# Patient Record
Sex: MALE | Race: WHITE | NOT HISPANIC OR LATINO | Employment: FULL TIME | URBAN - METROPOLITAN AREA
[De-identification: names, ages, dates, MRNs, and addresses within clinical notes are randomized per-mention and may not be internally consistent; named-entity substitution may affect disease eponyms.]

---

## 2017-01-26 ENCOUNTER — HOSPITAL ENCOUNTER (OUTPATIENT)
Dept: ORTHOPEDIC SURGERY | Facility: CLINIC | Age: 37
Discharge: HOME | End: 2017-01-26
Attending: PODIATRIST | Admitting: PODIATRIST

## 2017-03-07 ENCOUNTER — HOSPITAL ENCOUNTER (OUTPATIENT)
Dept: PHYSICAL THERAPY | Facility: HOSPITAL | Age: 37
Setting detail: RECURRING SERIES
Discharge: HOME | End: 2017-03-30
Attending: PODIATRIST | Admitting: PODIATRIST

## 2017-05-09 ENCOUNTER — HOSPITAL ENCOUNTER (OUTPATIENT)
Dept: PREADMISSION TESTING | Facility: HOSPITAL | Age: 37
Discharge: HOME | End: 2017-05-09
Attending: PODIATRIST | Admitting: PODIATRIST

## 2017-05-09 LAB
ANION GAP SERPL CALC-SCNC: 13.1 MMOL/L (ref 10–20)
BACTERIA SPEC AEROBE CULT: NORMAL
BASOPHILS # BLD AUTO: 0 10*3/UL (ref 0–0.2)
BASOPHILS NFR BLD AUTO: 1 % (ref 0–2)
BUN SERPL-MCNC: 15 MG/DL (ref 8–20)
BUN/CREAT SERPL: 13.6 (ref 6.2–20.3)
CALCIUM SERPL-MCNC: 9.4 MG/DL (ref 8.9–10.3)
CHLORIDE SERPL-SCNC: 103 MMOL/L (ref 101–111)
CONV CO2: 25 MMOL/L (ref 22–32)
CREAT UR-MCNC: 1.1 MG/DL (ref 0.7–1.2)
DIFFERENTIAL METHOD BLD: (no result)
EOSINOPHIL # BLD AUTO: 0.2 10*3/UL (ref 0–0.3)
EOSINOPHIL # BLD AUTO: 3 % (ref 0–3)
ERYTHROCYTE [DISTWIDTH] IN BLOOD BY AUTOMATED COUNT: 13.5 % (ref 11.5–14.5)
GLUCOSE SERPL-MCNC: 84 MG/DL (ref 65–99)
HCT VFR BLD AUTO: 46.3 % (ref 40–54)
HGB BLD-MCNC: 15.4 G/DL (ref 14–18)
LYMPHOCYTES # BLD AUTO: 1.2 10*3/UL (ref 0.8–4.8)
LYMPHOCYTES NFR BLD AUTO: 17 % (ref 18–42)
Lab: NORMAL
MCH RBC QN AUTO: 31.1 PG (ref 26–32)
MCHC RBC AUTO-ENTMCNC: 33.2 G/DL (ref 32–36)
MCV RBC AUTO: 93.6 FL (ref 80–94)
MICRO REPORT STATUS: NORMAL
MONOCYTES # BLD AUTO: 0.7 10*3/UL (ref 0.1–1.3)
MONOCYTES NFR BLD AUTO: 10 % (ref 2–11)
NEUTROPHILS # BLD AUTO: 4.7 10*3/UL (ref 2.3–8.6)
NEUTROPHILS NFR BLD AUTO: 69 % (ref 50–75)
NRBC BLD AUTO-RTO: 0 /100{WBCS}
NRBC/RBC NFR BLD MANUAL: 0 10*3/UL
PLATELET # BLD AUTO: 183 10*3/UL (ref 150–450)
PMV BLD AUTO: 9.3 FL (ref 7.4–10.4)
POTASSIUM SERPL-SCNC: 4.1 MMOL/L (ref 3.6–5.1)
RBC # BLD AUTO: 4.95 10*6/UL (ref 4.6–6)
SODIUM SERPL-SCNC: 137 MMOL/L (ref 136–144)
SPECIMEN SOURCE: NORMAL
WBC # BLD AUTO: 6.8 10*3/UL (ref 4.5–11.5)

## 2017-05-12 ENCOUNTER — HOSPITAL ENCOUNTER (OUTPATIENT)
Dept: PREOP | Facility: HOSPITAL | Age: 37
Setting detail: HOSPITAL OUTPATIENT SURGERY
Discharge: HOME | End: 2017-05-12
Attending: PODIATRIST | Admitting: PODIATRIST

## 2017-10-16 ENCOUNTER — APPOINTMENT (OUTPATIENT)
Dept: WOMENS IMAGING | Facility: HOSPITAL | Age: 37
End: 2017-10-16

## 2017-10-16 PROCEDURE — 73630 X-RAY EXAM OF FOOT: CPT | Performed by: RADIOLOGY

## 2017-10-23 ENCOUNTER — HOSPITAL ENCOUNTER (OUTPATIENT)
Dept: ORTHOPEDIC SURGERY | Facility: CLINIC | Age: 37
Discharge: HOME | End: 2017-10-23
Attending: PODIATRIST | Admitting: PODIATRIST

## 2018-01-11 ENCOUNTER — HOSPITAL ENCOUNTER (OUTPATIENT)
Dept: PREADMISSION TESTING | Facility: HOSPITAL | Age: 38
Discharge: HOME | End: 2018-01-11
Attending: PODIATRIST | Admitting: PODIATRIST

## 2018-01-11 LAB
ANION GAP SERPL CALC-SCNC: 11 MMOL/L (ref 10–20)
BASOPHILS # BLD AUTO: 0 10*3/UL (ref 0–0.2)
BASOPHILS NFR BLD AUTO: 1 % (ref 0–2)
BUN SERPL-MCNC: 10 MG/DL (ref 8–20)
BUN/CREAT SERPL: 9.1 (ref 6.2–20.3)
CALCIUM SERPL-MCNC: 9.4 MG/DL (ref 8.9–10.3)
CHLORIDE SERPL-SCNC: 104 MMOL/L (ref 101–111)
CONV CO2: 27 MMOL/L (ref 22–32)
CREAT UR-MCNC: 1.1 MG/DL (ref 0.7–1.2)
DIFFERENTIAL METHOD BLD: (no result)
EOSINOPHIL # BLD AUTO: 0.2 10*3/UL (ref 0–0.3)
EOSINOPHIL # BLD AUTO: 2 % (ref 0–3)
ERYTHROCYTE [DISTWIDTH] IN BLOOD BY AUTOMATED COUNT: 13.7 % (ref 11.5–14.5)
GLUCOSE SERPL-MCNC: 88 MG/DL (ref 65–99)
HCT VFR BLD AUTO: 44.6 % (ref 40–54)
HGB BLD-MCNC: 14.9 G/DL (ref 14–18)
LYMPHOCYTES # BLD AUTO: 1 10*3/UL (ref 0.8–4.8)
LYMPHOCYTES NFR BLD AUTO: 14 % (ref 18–42)
MCH RBC QN AUTO: 31.9 PG (ref 26–32)
MCHC RBC AUTO-ENTMCNC: 33.3 G/DL (ref 32–36)
MCV RBC AUTO: 95.9 FL (ref 80–94)
MONOCYTES # BLD AUTO: 0.9 10*3/UL (ref 0.1–1.3)
MONOCYTES NFR BLD AUTO: 12 % (ref 2–11)
NEUTROPHILS # BLD AUTO: 5.2 10*3/UL (ref 2.3–8.6)
NEUTROPHILS NFR BLD AUTO: 71 % (ref 50–75)
NRBC BLD AUTO-RTO: 0 /100{WBCS}
NRBC/RBC NFR BLD MANUAL: 0 10*3/UL
PLATELET # BLD AUTO: 139 10*3/UL (ref 150–450)
PMV BLD AUTO: 9.5 FL (ref 7.4–10.4)
POTASSIUM SERPL-SCNC: 4 MMOL/L (ref 3.6–5.1)
RBC # BLD AUTO: 4.65 10*6/UL (ref 4.6–6)
SODIUM SERPL-SCNC: 138 MMOL/L (ref 136–144)
WBC # BLD AUTO: 7.2 10*3/UL (ref 4.5–11.5)

## 2018-01-12 ENCOUNTER — HOSPITAL ENCOUNTER (OUTPATIENT)
Dept: PREOP | Facility: HOSPITAL | Age: 38
Setting detail: HOSPITAL OUTPATIENT SURGERY
Discharge: HOME | End: 2018-01-12
Attending: PODIATRIST | Admitting: PODIATRIST

## 2019-08-07 ENCOUNTER — OFFICE VISIT (OUTPATIENT)
Dept: FAMILY MEDICINE CLINIC | Facility: CLINIC | Age: 39
End: 2019-08-07

## 2019-08-07 VITALS
SYSTOLIC BLOOD PRESSURE: 113 MMHG | BODY MASS INDEX: 28.23 KG/M2 | WEIGHT: 244 LBS | DIASTOLIC BLOOD PRESSURE: 74 MMHG | HEART RATE: 69 BPM | OXYGEN SATURATION: 97 % | HEIGHT: 78 IN

## 2019-08-07 DIAGNOSIS — R55 SYNCOPE, UNSPECIFIED SYNCOPE TYPE: ICD-10-CM

## 2019-08-07 DIAGNOSIS — Z00.00 ENCOUNTER FOR WELL ADULT EXAM WITHOUT ABNORMAL FINDINGS: Primary | ICD-10-CM

## 2019-08-07 PROCEDURE — 99396 PREV VISIT EST AGE 40-64: CPT | Performed by: FAMILY MEDICINE

## 2019-08-07 PROCEDURE — 93000 ELECTROCARDIOGRAM COMPLETE: CPT | Performed by: FAMILY MEDICINE

## 2019-08-07 NOTE — PROGRESS NOTES
"Teetee Cobb is a 38 y.o. male.     He is in for his annual wellness exam.  He has had some syncopal episodes, particularly with bowel movements but not always.  He has not had any chest pain.  He has not had any difficulty breathing.  He exercises regularly and his job requires physical activity and he has not had any symptoms with either of those.                         /74 (BP Location: Left arm, Patient Position: Sitting, Cuff Size: Adult)   Pulse 69   Ht 198.1 cm (78\")   Wt 111 kg (244 lb)   SpO2 97%   BMI 28.20 kg/m²       Chief Complaint   Patient presents with   • Annual Exam         No current outpatient medications on file.        The following portions of the patient's history were reviewed and updated as appropriate: allergies, current medications, past family history, past medical history, past social history, past surgical history and problem list.    Review of Systems   Constitutional: Negative for activity change, fatigue and fever.   HENT: Negative for congestion, sinus pressure, sinus pain, sore throat and trouble swallowing.    Eyes: Negative for visual disturbance.   Respiratory: Negative for chest tightness, shortness of breath and wheezing.    Cardiovascular: Negative for chest pain.   Gastrointestinal: Negative for abdominal distention, abdominal pain, constipation, diarrhea, nausea and vomiting.   Genitourinary: Negative for difficulty urinating, dysuria, frequency and urgency.   Musculoskeletal: Negative for back pain and neck pain.   Neurological: Positive for syncope and light-headedness. Negative for dizziness and weakness.   Psychiatric/Behavioral: Negative for agitation, hallucinations, sleep disturbance and suicidal ideas.       Objective     Physical Exam   Constitutional: He is oriented to person, place, and time. He appears well-developed and well-nourished. No distress.   HENT:   Nose: Nose normal.   Mouth/Throat: Oropharynx is clear and moist. No " oropharyngeal exudate.   Eyes: Conjunctivae are normal. Pupils are equal, round, and reactive to light.   Neck: Normal range of motion. Neck supple. No thyromegaly present.   Cardiovascular: Normal rate, regular rhythm and normal heart sounds.   No murmur heard.  Pulmonary/Chest: Effort normal and breath sounds normal. He has no wheezes. He has no rales.   Abdominal: Soft. Bowel sounds are normal. He exhibits no mass. There is no guarding.   Musculoskeletal: Normal range of motion.   Lymphadenopathy:     He has no cervical adenopathy.   Neurological: He is alert and oriented to person, place, and time. He displays normal reflexes.   Skin: Skin is warm and dry. No rash noted.   Nursing note and vitals reviewed.      ECG 12 Lead  Date/Time: 8/7/2019 11:34 AM  Performed by: Rashel Manrique MD  Authorized by: Rashel Manrique MD   Comparison: compared with previous ECG from 5/15/2019  Similar to previous ECG  Rhythm: sinus bradycardia  Rate: bradycardic  BPM: 56  Conduction comments: Early repolarization  Q waves: all    ST Elevation: all  T Waves: T waves normal  QRS axis: normal  Other findings: early repolarization    Clinical impression: abnormal EKG  Comments: Referral being made to electrophysiology            Assessment/Plan   Problems Addressed this Visit     None      Visit Diagnoses     Encounter for well adult exam without abnormal findings    -  Primary    Syncope, unspecified syncope type        Relevant Orders    Ambulatory Referral to Cardiology    ECG 12 Lead      I am referring to cardiology for the syncope.  I reviewed labs that he had done earlier this year and his lipids and metabolic profile were all fine.  His PSA was also negative.  He was counseled on diet / exercise changes that he should make now that he is older.  He was advised to make sure he is getting proper rest and hydration as well.  I will see him back as indicated.

## 2019-08-28 ENCOUNTER — OFFICE VISIT (OUTPATIENT)
Dept: PODIATRY | Facility: CLINIC | Age: 39
End: 2019-08-28

## 2019-08-28 VITALS
HEART RATE: 65 BPM | BODY MASS INDEX: 28 KG/M2 | DIASTOLIC BLOOD PRESSURE: 78 MMHG | HEIGHT: 78 IN | WEIGHT: 242 LBS | SYSTOLIC BLOOD PRESSURE: 120 MMHG

## 2019-08-28 DIAGNOSIS — L60.1 ONYCHOLYSIS: Primary | ICD-10-CM

## 2019-08-28 PROCEDURE — 99213 OFFICE O/P EST LOW 20 MIN: CPT | Performed by: PODIATRIST

## 2019-08-28 PROCEDURE — 11730 AVULSION NAIL PLATE SIMPLE 1: CPT | Performed by: PODIATRIST

## 2019-08-28 NOTE — PROGRESS NOTES
"08/28/2019  Foot and Ankle Surgery - Established Patient/Follow-up  Provider: Dr. Yoel Santos DPM  Location: [facility]    Subjective:  Martinez Cobb is a 38 y.o. male.     Chief Complaint   Patient presents with   • Right Foot - Nail Problem       HPI: Patient returns with new issue involving the right great toe.  He states that he stubbed the toe several months ago and has noticed progressive loosening involving the nail plate.  He is concerned that he is going to cause further injury and would like to it evaluated.  He does have mild discomfort at times.  He has not noticed any signs of infection.  No other issues today.    No Known Allergies    No current outpatient medications on file prior to visit.     No current facility-administered medications on file prior to visit.        Objective   /78   Pulse 65   Ht 198.1 cm (78\")   Wt 110 kg (242 lb)   BMI 27.97 kg/m²     General Exam  Appearance: appears stated age and healthy   Orientation: alert and oriented to person, place, and time   Affect: appropriate   Gait: unimpaired     Foot/Ankle Exam  Inspection and Palpation  Ecchymosis - Right: none   Tenderness - Right: none     Swelling - Right: none     Arch - Right: normal   Hammertoes - Right: absent   Claw Toes - Right: absent   Mallet Toes - Right: absent   Hallux Valgus - Right: no   Hallux Limitus - Right: no   Skin - Right: skin intact    Nails -  Right: abnormally thick, dystrophic nails, onycholysis and subungual hematoma     Vascular  Dorsalis Pedis - Right: 3+   Posterior Tibial - Right: 3+     Neurologic  Saphenous Nerve Sensation  - Right: normal   Tibial Nerve Sensation - Right: normal   Superficial Peroneal Nerve Sensation - Right: normal   Deep Peroneal Nerve Sensation - Right: normal   Sural Nerve Sensation - Right: normal   Protective Sensation using [place] Monofilament - Right: 10   Achilles Reflex - Right: 2+     Muscle Strength  Dorsiflexion - Right: 5 "   Plantar Flexion - Right: 5   Eversion - Right: 5   Inversion - Right: 5   Great Toe Extension - Right: 5   Great Toe Flexion - Right: 5     Range of Motion  Normal right ankle ROM            Assessment/Plan   Martinez was seen today for nail problem.    Diagnoses and all orders for this visit:    Onycholysis      Patient presents with discomfort and loosening involving the right great toe.  Injury was sustained several months ago.  He denies any other issues.  I explained the situation and treatment options with him at length.  Given how loose the nail is, I have recommended that we proceed with total nail avulsion.  We did review risks of abnormal nail growth in the future.  He states that he understands.  Procedure was performed without complication.  I did review the postprocedural instruction sheet at length.  I have asked that he see me on an as-needed basis.    Total Nail Avulsion: Right hallux    Informed consent was obtained prior to the procedure.  Right hallux was cleansed with alcohol and the digit was blocked in a ring block fashion utilizing 5 mL of 1% lidocaine plain.  A digital tourniquet was applied to the digit.  The nail was lifted from the nail bed and nail margins with a [place]. The offending nail margins were grasped with a hemostat and removed.  The nail borders were explored with a curette to ensure adequate removal.  The nail fold and exposed nail bed were flushed with normal saline.  Antibiotic ointment followed by sterile compressive dressings were then applied.  The digital tourniquot was removed.  No excessive bleeding or complications were evident.  The patient tolerated procedure and local anesthetic well.    No orders of the defined types were placed in this encounter.         Note is dictated utilizing voice recognition software. Unfortunately this leads to occasional typographical errors. I apologize in advance if the situation occurs. If questions occur please do not hesitate to  call our office.

## 2020-08-04 ENCOUNTER — OFFICE VISIT (OUTPATIENT)
Dept: FAMILY MEDICINE CLINIC | Facility: CLINIC | Age: 40
End: 2020-08-04

## 2020-08-04 VITALS
OXYGEN SATURATION: 98 % | SYSTOLIC BLOOD PRESSURE: 127 MMHG | TEMPERATURE: 97.7 F | HEART RATE: 71 BPM | WEIGHT: 262 LBS | BODY MASS INDEX: 30.28 KG/M2 | DIASTOLIC BLOOD PRESSURE: 77 MMHG

## 2020-08-04 DIAGNOSIS — M25.522 CHRONIC ELBOW PAIN, LEFT: Primary | ICD-10-CM

## 2020-08-04 DIAGNOSIS — G89.29 CHRONIC ELBOW PAIN, LEFT: Primary | ICD-10-CM

## 2020-08-04 PROCEDURE — 99213 OFFICE O/P EST LOW 20 MIN: CPT | Performed by: FAMILY MEDICINE

## 2020-08-04 RX ORDER — TRAMADOL HYDROCHLORIDE 50 MG/1
50 TABLET ORAL EVERY 6 HOURS PRN
Qty: 20 TABLET | Refills: 0 | Status: SHIPPED | OUTPATIENT
Start: 2020-08-04 | End: 2020-08-24

## 2020-08-04 RX ORDER — PREDNISONE 10 MG/1
TABLET ORAL
Qty: 40 TABLET | Refills: 0 | Status: SHIPPED | OUTPATIENT
Start: 2020-08-04 | End: 2020-08-24

## 2020-08-04 NOTE — PROGRESS NOTES
Subjective   Martinez Cobb is a 39 y.o. male.     He is in today with some lingering left elbow pain.  He had a steroid pack about a year ago that helped briefly but then the symptoms returned. He has not had any history of injury or trauma that he is aware. He has noted pain with trying to lift his son or other significant load, and the pain radiates into his forearm near his hand. He has not had any swelling of the elbow.  He has not had any numbness or tingling in the arm or hand. He has not had any loss of strength that he can detect, just the severe pain.         /77 (BP Location: Left arm, Patient Position: Sitting, Cuff Size: Adult)   Pulse 71   Temp 97.7 °F (36.5 °C) (Temporal)   Wt 119 kg (262 lb)   SpO2 98%   BMI 30.28 kg/m²       Chief Complaint   Patient presents with   • Elbow Pain     left elbow pain had for about a yr but its getting worse         No current outpatient medications on file.        The following portions of the patient's history were reviewed and updated as appropriate: allergies, current medications, past family history, past medical history, past social history, past surgical history and problem list.    Review of Systems   Unable to perform ROS: Acuity of condition       Objective   Physical Exam   Constitutional: No distress.   Neck: Neck supple.   Musculoskeletal:   Pain in L elbow with no deformity or defect   Pain with  of hand and with supination/ pronation     Lymphadenopathy:     He has no cervical adenopathy.   Nursing note and vitals reviewed.        Assessment/Plan   Problems Addressed this Visit     None      Visit Diagnoses     Chronic elbow pain, left    -  Primary    Relevant Medications    traMADol (ULTRAM) 50 MG tablet    Other Relevant Orders    MRI Elbow Left Without Contrast        I will refer for MRI  Likely will need orthopedics  I will give steroids and meds for pain  I will see back as indicated by findings

## 2020-08-12 ENCOUNTER — HOSPITAL ENCOUNTER (OUTPATIENT)
Dept: MRI IMAGING | Facility: HOSPITAL | Age: 40
Discharge: HOME | End: 2020-08-12
Admitting: FAMILY MEDICINE

## 2020-08-12 DIAGNOSIS — G89.29 CHRONIC ELBOW PAIN, LEFT: ICD-10-CM

## 2020-08-12 DIAGNOSIS — M25.522 CHRONIC ELBOW PAIN, LEFT: ICD-10-CM

## 2020-08-12 PROCEDURE — 73221 MRI JOINT UPR EXTREM W/O DYE: CPT

## 2020-08-18 DIAGNOSIS — G89.29 CHRONIC ELBOW PAIN, LEFT: Primary | ICD-10-CM

## 2020-08-18 DIAGNOSIS — M25.522 CHRONIC ELBOW PAIN, LEFT: Primary | ICD-10-CM

## 2020-08-24 ENCOUNTER — OFFICE VISIT (OUTPATIENT)
Dept: ORTHOPEDIC SURGERY | Facility: CLINIC | Age: 40
End: 2020-08-24

## 2020-08-24 VITALS
HEIGHT: 78 IN | BODY MASS INDEX: 30.31 KG/M2 | DIASTOLIC BLOOD PRESSURE: 72 MMHG | SYSTOLIC BLOOD PRESSURE: 135 MMHG | WEIGHT: 262 LBS | HEART RATE: 71 BPM

## 2020-08-24 DIAGNOSIS — M77.12 LEFT LATERAL EPICONDYLITIS: Primary | ICD-10-CM

## 2020-08-24 PROCEDURE — 99203 OFFICE O/P NEW LOW 30 MIN: CPT | Performed by: ORTHOPAEDIC SURGERY

## 2020-08-24 PROCEDURE — 20550 NJX 1 TENDON SHEATH/LIGAMENT: CPT | Performed by: ORTHOPAEDIC SURGERY

## 2020-08-24 RX ORDER — MELOXICAM 15 MG/1
15 TABLET ORAL DAILY PRN
Qty: 30 TABLET | Refills: 4 | Status: SHIPPED | OUTPATIENT
Start: 2020-08-24 | End: 2020-11-19

## 2020-08-24 RX ORDER — TRIAMCINOLONE ACETONIDE 40 MG/ML
20 INJECTION, SUSPENSION INTRA-ARTICULAR; INTRAMUSCULAR ONCE
Status: COMPLETED | OUTPATIENT
Start: 2020-08-24 | End: 2020-08-24

## 2020-08-24 RX ADMIN — TRIAMCINOLONE ACETONIDE 20 MG: 40 INJECTION, SUSPENSION INTRA-ARTICULAR; INTRAMUSCULAR at 09:15

## 2020-08-24 NOTE — PROGRESS NOTES
"     Patient ID: Martinez Cobb is a 39 y.o. male.    Chief Complaint:    Chief Complaint   Patient presents with   • Left Elbow - Consult       HPI:  Segun is a 39-year-old gentleman here with about a year of left elbow pain in consultation from Dr. Manrique.  There is no injury.  He works as a  and works out at the gym.  He has significant pain over the lateral condyle.  He has used ice and oral medication without significant relief.  Pain is sharp and a 6/10 and worse with lifting and somewhat better only with rest  History reviewed. No pertinent past medical history.    Past Surgical History:   Procedure Laterality Date   • FOOT SURGERY     • SHOULDER SURGERY Bilateral        Family History   Problem Relation Age of Onset   • Diabetes Father    • Hypertension Father    • Hyperlipidemia Father    • Heart disease Father           Social History     Occupational History   • Not on file   Tobacco Use   • Smoking status: Former Smoker     Types: Cigarettes     Last attempt to quit: 2012     Years since quittin.0   • Smokeless tobacco: Current User   Substance and Sexual Activity   • Alcohol use: Yes     Comment: socialy   • Drug use: No   • Sexual activity: Defer      Review of Systems   Cardiovascular: Negative for chest pain.   Musculoskeletal: Positive for arthralgias.       Objective:    /72   Pulse 71   Ht 198.1 cm (78\")   Wt 119 kg (262 lb)   BMI 30.28 kg/m²     Physical Examination:  He is a pleasant male in no distress. He is alert and oriented x3 and appears his stated age.  Left elbow demonstrates intact skin with moderate pain over the lateral upper condyle.  Elbow range of motion 0 to 135 degrees with full pronation and supination and pain on resisted wrist extension.Sensory and motor exam are intact all distributions. Radial pulse is palpable and capillary refill is less than two seconds to all digits    Imaging:  MRI demonstrates common extensor tendinopathy of the " left elbow    Assessment:  Left lateral epicondylitis    Plan:  Treatment options discussed, Risks and benefits of the injection were discussed. Under sterile technique and written consent I injected 20mg of Kenalog  and 1/2cc of 1% Lidocaine plain into the affected area. It was well tolerated. Postinjection instructions were given.  Continue home exercise program with activity restriction and a tennis elbow strap and see me as needed.  Also wrote for meloxicam        Disclaimer: Please note that areas of this note were completed with computer voice recognition software.  Quite often unanticipated grammatical, syntax, homophones, and other interpretive errors are inadvertently transcribed by the computer software. Please excuse any errors that have escaped final proofreading.

## 2020-11-19 ENCOUNTER — PREP FOR SURGERY (OUTPATIENT)
Dept: OTHER | Facility: HOSPITAL | Age: 40
End: 2020-11-19

## 2020-11-19 ENCOUNTER — OFFICE VISIT (OUTPATIENT)
Dept: ORTHOPEDIC SURGERY | Facility: CLINIC | Age: 40
End: 2020-11-19

## 2020-11-19 VITALS
HEART RATE: 66 BPM | DIASTOLIC BLOOD PRESSURE: 68 MMHG | HEIGHT: 78 IN | BODY MASS INDEX: 30.31 KG/M2 | WEIGHT: 262 LBS | SYSTOLIC BLOOD PRESSURE: 118 MMHG

## 2020-11-19 DIAGNOSIS — M77.12 LEFT LATERAL EPICONDYLITIS: Primary | ICD-10-CM

## 2020-11-19 PROCEDURE — 99214 OFFICE O/P EST MOD 30 MIN: CPT | Performed by: ORTHOPAEDIC SURGERY

## 2020-11-19 NOTE — PROGRESS NOTES
"     Patient ID: Martinez Cobb is a 40 y.o. male.  Left elbow pain  Segun is a 40-year-old gentleman here with lateral condyle-itis.  He is now had 2 previous cortisone injections and extensive nonoperative management with bracing and oral medication but continues to have severe pain and weakness in the left arm  Review of Systems:  Left elbow pain  Denies chest pain      Objective:    /68   Pulse 66   Ht 198.1 cm (78\")   Wt 119 kg (262 lb)   BMI 30.28 kg/m²     Physical Examination:     He is a pleasant male in no distress. He is alert and oriented x3 and appears his stated age.  Left elbow demonstrates intact skin with moderate pain over the lateral upper condyle.  Elbow range of motion 0 to 135 degrees with full pronation and supination and pain on resisted wrist extension.Sensory and motor exam are intact all distributions. Radial pulse is palpable and capillary refill is less than two seconds to all digits    Imaging:       Assessment:    Lateral epicondylitis left elbow    Plan:  Further treatment options including PRP and surgery were discussed and he would like to proceed with left tennis elbow repair  Risks and benefits, specifically risks of bleeding, scar, infection, fracture, stiffness, retear, nerve, tendon, artery damage, the need for further surgery, DVT, and loss of life or limb were discussed. All questions were answered and addressed. Rehabilitation restrictions and timeframes were discussed.          Disclaimer: Please note that areas of this note were completed with computer voice recognition software.  Quite often unanticipated grammatical, syntax, homophones, and other interpretive errors are inadvertently transcribed by the computer software. Please excuse any errors that have escaped final proofreading.  "

## 2020-11-20 PROBLEM — M77.12 LEFT LATERAL EPICONDYLITIS: Status: ACTIVE | Noted: 2020-11-20

## 2020-12-14 ENCOUNTER — TELEPHONE (OUTPATIENT)
Dept: ORTHOPEDIC SURGERY | Facility: CLINIC | Age: 40
End: 2020-12-14

## 2020-12-14 NOTE — TELEPHONE ENCOUNTER
Caller: DESTINEE ARANDA    Relationship to patient: SELF    Best call back number:     Patient is needing: HE WANTS TO ASK SOME QUESTIONS ABOUT HIS UPCOMING SURGERY ON 1/5/2021.  TRIED TO TRANSFER BUT NO ONE COULD ANSWER

## 2020-12-14 NOTE — TELEPHONE ENCOUNTER
CALLED BACK PATIENT. JUST HAD QUESTIONS IF HIS SURGERY WOULD BE R/S. TOLD HIM AT THIS TIME WE HAVE NOT GOT ANY UPDATES ON IF WE NEED TO MOVE SURGERIES. TOLD PATIENT THAT I WOULD CALL HIM IF ANYTHING CHANGES.

## 2020-12-28 ENCOUNTER — HOSPITAL ENCOUNTER (OUTPATIENT)
Dept: CARDIOLOGY | Facility: HOSPITAL | Age: 40
Discharge: HOME | End: 2020-12-28

## 2020-12-28 ENCOUNTER — TELEPHONE (OUTPATIENT)
Dept: ORTHOPEDIC SURGERY | Facility: CLINIC | Age: 40
End: 2020-12-28

## 2020-12-28 ENCOUNTER — LAB (OUTPATIENT)
Dept: LAB | Facility: HOSPITAL | Age: 40
End: 2020-12-28

## 2020-12-28 DIAGNOSIS — M77.12 LEFT LATERAL EPICONDYLITIS: ICD-10-CM

## 2020-12-28 DIAGNOSIS — M77.12 LEFT LATERAL EPICONDYLITIS: Primary | ICD-10-CM

## 2020-12-28 LAB
ABO GROUP BLD: NORMAL
ANION GAP SERPL CALCULATED.3IONS-SCNC: 9.3 MMOL/L (ref 5–15)
APTT PPP: 25.8 SECONDS (ref 24–31)
BASOPHILS # BLD AUTO: 0.03 10*3/MM3 (ref 0–0.2)
BASOPHILS NFR BLD AUTO: 0.5 % (ref 0–1.5)
BLD GP AB SCN SERPL QL: NEGATIVE
BUN SERPL-MCNC: 14 MG/DL (ref 6–20)
BUN/CREAT SERPL: 14 (ref 7–25)
CALCIUM SPEC-SCNC: 8.8 MG/DL (ref 8.6–10.5)
CHLORIDE SERPL-SCNC: 105 MMOL/L (ref 98–107)
CO2 SERPL-SCNC: 25.7 MMOL/L (ref 22–29)
CREAT SERPL-MCNC: 1 MG/DL (ref 0.76–1.27)
DEPRECATED RDW RBC AUTO: 43.7 FL (ref 37–54)
EOSINOPHIL # BLD AUTO: 0.12 10*3/MM3 (ref 0–0.4)
EOSINOPHIL NFR BLD AUTO: 2.1 % (ref 0.3–6.2)
ERYTHROCYTE [DISTWIDTH] IN BLOOD BY AUTOMATED COUNT: 13 % (ref 12.3–15.4)
GFR SERPL CREATININE-BSD FRML MDRD: 82 ML/MIN/1.73
GLUCOSE SERPL-MCNC: 98 MG/DL (ref 65–99)
HCT VFR BLD AUTO: 44.4 % (ref 37.5–51)
HGB BLD-MCNC: 15.1 G/DL (ref 13–17.7)
IMM GRANULOCYTES # BLD AUTO: 0.01 10*3/MM3 (ref 0–0.05)
IMM GRANULOCYTES NFR BLD AUTO: 0.2 % (ref 0–0.5)
INR PPP: 1.04 (ref 0.93–1.1)
LYMPHOCYTES # BLD AUTO: 0.96 10*3/MM3 (ref 0.7–3.1)
LYMPHOCYTES NFR BLD AUTO: 17.1 % (ref 19.6–45.3)
MCH RBC QN AUTO: 31.3 PG (ref 26.6–33)
MCHC RBC AUTO-ENTMCNC: 34 G/DL (ref 31.5–35.7)
MCV RBC AUTO: 91.9 FL (ref 79–97)
MONOCYTES # BLD AUTO: 0.65 10*3/MM3 (ref 0.1–0.9)
MONOCYTES NFR BLD AUTO: 11.6 % (ref 5–12)
NEUTROPHILS NFR BLD AUTO: 3.85 10*3/MM3 (ref 1.7–7)
NEUTROPHILS NFR BLD AUTO: 68.5 % (ref 42.7–76)
NRBC BLD AUTO-RTO: 0 /100 WBC (ref 0–0.2)
PLATELET # BLD AUTO: 166 10*3/MM3 (ref 140–450)
PMV BLD AUTO: 10.9 FL (ref 6–12)
POTASSIUM SERPL-SCNC: 4.4 MMOL/L (ref 3.5–5.2)
PROTHROMBIN TIME: 11.4 SECONDS (ref 9.6–11.7)
RBC # BLD AUTO: 4.83 10*6/MM3 (ref 4.14–5.8)
RH BLD: POSITIVE
SODIUM SERPL-SCNC: 140 MMOL/L (ref 136–145)
T&S EXPIRATION DATE: NORMAL
WBC # BLD AUTO: 5.62 10*3/MM3 (ref 3.4–10.8)

## 2020-12-28 PROCEDURE — 86901 BLOOD TYPING SEROLOGIC RH(D): CPT

## 2020-12-28 PROCEDURE — 86900 BLOOD TYPING SEROLOGIC ABO: CPT

## 2020-12-28 PROCEDURE — 80048 BASIC METABOLIC PNL TOTAL CA: CPT

## 2020-12-28 PROCEDURE — 93010 ELECTROCARDIOGRAM REPORT: CPT | Performed by: INTERNAL MEDICINE

## 2020-12-28 PROCEDURE — 86850 RBC ANTIBODY SCREEN: CPT

## 2020-12-28 PROCEDURE — 85610 PROTHROMBIN TIME: CPT

## 2020-12-28 PROCEDURE — 85025 COMPLETE CBC W/AUTO DIFF WBC: CPT

## 2020-12-28 PROCEDURE — 93005 ELECTROCARDIOGRAM TRACING: CPT | Performed by: ORTHOPAEDIC SURGERY

## 2020-12-28 PROCEDURE — 36415 COLL VENOUS BLD VENIPUNCTURE: CPT

## 2020-12-28 PROCEDURE — 85730 THROMBOPLASTIN TIME PARTIAL: CPT

## 2020-12-28 NOTE — TELEPHONE ENCOUNTER
Caller: SELF    Best call back number: [phone]    What form or medical record are you requesting: FMLA    Who is requesting this form or medical record from you: WORK    How would you like to receive the form or medical records (pick-up, mail, fax):    Timeframe paperwork needed: ASAP    Additional notes: PT DROPPED FMLA PAPERWORK OFF A FEW WEEKS AGO AT OFFICE AND HAS NOT HEARD ANYTHING IN REGARDS. WOULD LIKE TO PICK THEM UP OR KNOW THE STATUS OF THEM. PLEASE CALL PT AT THE NUMBER ABOVE TO ADVISE OF STATUS.

## 2021-01-01 LAB — QT INTERVAL: 421 MS

## 2021-01-02 ENCOUNTER — LAB (OUTPATIENT)
Dept: LAB | Facility: HOSPITAL | Age: 41
End: 2021-01-02

## 2021-01-02 LAB — SARS-COV-2 ORF1AB RESP QL NAA+PROBE: NOT DETECTED

## 2021-01-02 PROCEDURE — C9803 HOPD COVID-19 SPEC COLLECT: HCPCS

## 2021-01-02 PROCEDURE — U0004 COV-19 TEST NON-CDC HGH THRU: HCPCS

## 2021-01-04 ENCOUNTER — ANESTHESIA EVENT (OUTPATIENT)
Dept: PERIOP | Facility: HOSPITAL | Age: 41
End: 2021-01-04

## 2021-01-04 DIAGNOSIS — M77.12 LEFT LATERAL EPICONDYLITIS: Primary | ICD-10-CM

## 2021-01-04 RX ORDER — PROMETHAZINE HYDROCHLORIDE 12.5 MG/1
12.5 TABLET ORAL EVERY 6 HOURS PRN
Qty: 21 TABLET | Refills: 1 | Status: SHIPPED | OUTPATIENT
Start: 2021-01-04 | End: 2021-01-20

## 2021-01-04 RX ORDER — NAPROXEN 500 MG/1
500 TABLET ORAL 2 TIMES DAILY WITH MEALS
Qty: 28 TABLET | Refills: 0 | Status: SHIPPED | OUTPATIENT
Start: 2021-01-04 | End: 2021-12-07

## 2021-01-04 RX ORDER — OXYCODONE HYDROCHLORIDE AND ACETAMINOPHEN 5; 325 MG/1; MG/1
1 TABLET ORAL EVERY 6 HOURS PRN
Qty: 20 TABLET | Refills: 0 | Status: SHIPPED | OUTPATIENT
Start: 2021-01-04 | End: 2021-01-20

## 2021-01-04 RX ORDER — CEPHALEXIN 500 MG/1
500 CAPSULE ORAL 4 TIMES DAILY
Qty: 4 CAPSULE | Refills: 0 | Status: SHIPPED | OUTPATIENT
Start: 2021-01-04 | End: 2021-01-05

## 2021-01-04 NOTE — H&P
Patient Care Team:  Rashel Manrique MD as PCP - General  Dominic Marte APRN as Nurse Practitioner (Family Medicine)    Chief complaint left elbow pain    Jaylen is a 40-year-old gentleman here with chronic left elbow pain presented for tennis elbow repair      Review of Systems   Cardiovascular: Negative for chest pain.   Musculoskeletal: Positive for arthralgias.        Past History:  Medical History: has a past medical history of Tennis elbow.   Surgical History: has a past surgical history that includes Foot surgery (Bilateral) and Shoulder surgery (Bilateral).   Family History: family history includes Diabetes in his father; Heart disease in his father; Hyperlipidemia in his father; Hypertension in his father.   Social History: reports that he quit smoking about 8 years ago. His smoking use included cigarettes. He uses smokeless tobacco. He reports current alcohol use. He reports that he does not use drugs.    No medications prior to admission.      Allergies: Patient has no known allergies.    Objective      Vital Signs     He is a pleasant male in no distress. He is alert and oriented x3 and appears his stated age.  Left elbow demonstrates intact skin with moderate pain over the lateral upper condyle.  Elbow range of motion 0 to 135 degrees with full pronation and supination and pain on resisted wrist extension.Sensory and motor exam are intact all distributions. Radial pulse is palpable and capillary refill is less than two seconds to all digits     Imaging:         Assessment:    Lateral epicondylitis left elbow     Plan:  Further treatment options including PRP and surgery were discussed and he would like to proceed with left tennis elbow repair  Risks and benefits, specifically risks of bleeding, scar, infection, fracture, stiffness, retear, nerve, tendon, artery damage, the need for further surgery, DVT, and loss of life or limb were discussed. All questions were answered and  addressed. Rehabilitation restrictions and timeframes were discussed

## 2021-01-05 ENCOUNTER — ANESTHESIA (OUTPATIENT)
Dept: PERIOP | Facility: HOSPITAL | Age: 41
End: 2021-01-05

## 2021-01-05 ENCOUNTER — HOSPITAL ENCOUNTER (OUTPATIENT)
Facility: HOSPITAL | Age: 41
Setting detail: HOSPITAL OUTPATIENT SURGERY
Discharge: HOME | End: 2021-01-05
Attending: ORTHOPAEDIC SURGERY | Admitting: ORTHOPAEDIC SURGERY

## 2021-01-05 VITALS
SYSTOLIC BLOOD PRESSURE: 113 MMHG | TEMPERATURE: 96.4 F | HEIGHT: 78 IN | DIASTOLIC BLOOD PRESSURE: 66 MMHG | BODY MASS INDEX: 29.33 KG/M2 | RESPIRATION RATE: 16 BRPM | WEIGHT: 253.53 LBS | HEART RATE: 77 BPM | OXYGEN SATURATION: 95 %

## 2021-01-05 DIAGNOSIS — M77.12 LEFT LATERAL EPICONDYLITIS: ICD-10-CM

## 2021-01-05 PROCEDURE — 25010000002 PROPOFOL 10 MG/ML EMULSION: Performed by: ANESTHESIOLOGIST ASSISTANT

## 2021-01-05 PROCEDURE — C1713 ANCHOR/SCREW BN/BN,TIS/BN: HCPCS | Performed by: ORTHOPAEDIC SURGERY

## 2021-01-05 PROCEDURE — 25010000002 FENTANYL CITRATE (PF) 100 MCG/2ML SOLUTION: Performed by: ANESTHESIOLOGIST ASSISTANT

## 2021-01-05 PROCEDURE — 25010000002 ONDANSETRON PER 1 MG: Performed by: ANESTHESIOLOGIST ASSISTANT

## 2021-01-05 PROCEDURE — 25010000002 ROPIVACAINE PER 1 MG: Performed by: ANESTHESIOLOGY

## 2021-01-05 PROCEDURE — 25010000002 DEXAMETHASONE PER 1 MG: Performed by: ANESTHESIOLOGY

## 2021-01-05 PROCEDURE — 25010000002 DEXAMETHASONE PER 1 MG: Performed by: ANESTHESIOLOGIST ASSISTANT

## 2021-01-05 PROCEDURE — 24359 REPAIR ELBOW DEB/ATTCH OPEN: CPT | Performed by: ORTHOPAEDIC SURGERY

## 2021-01-05 PROCEDURE — 25010000002 MIDAZOLAM PER 1 MG: Performed by: ANESTHESIOLOGY

## 2021-01-05 PROCEDURE — 76942 ECHO GUIDE FOR BIOPSY: CPT | Performed by: ORTHOPAEDIC SURGERY

## 2021-01-05 DEVICE — DEV CONTRL TISS STRATAFIX SPIRAL MNCRYL UD 3/0 PLS 30CM: Type: IMPLANTABLE DEVICE | Site: ELBOW | Status: FUNCTIONAL

## 2021-01-05 DEVICE — IMPLANTABLE DEVICE: Type: IMPLANTABLE DEVICE | Site: ELBOW | Status: FUNCTIONAL

## 2021-01-05 RX ORDER — HYDROCODONE BITARTRATE AND ACETAMINOPHEN 5; 325 MG/1; MG/1
1 TABLET ORAL ONCE AS NEEDED
Status: DISCONTINUED | OUTPATIENT
Start: 2021-01-05 | End: 2021-01-05 | Stop reason: HOSPADM

## 2021-01-05 RX ORDER — GLYCOPYRROLATE 1 MG/5 ML
SYRINGE (ML) INTRAVENOUS AS NEEDED
Status: DISCONTINUED | OUTPATIENT
Start: 2021-01-05 | End: 2021-01-05 | Stop reason: SURG

## 2021-01-05 RX ORDER — LIDOCAINE HYDROCHLORIDE 10 MG/ML
INJECTION, SOLUTION EPIDURAL; INFILTRATION; INTRACAUDAL; PERINEURAL AS NEEDED
Status: DISCONTINUED | OUTPATIENT
Start: 2021-01-05 | End: 2021-01-05 | Stop reason: SURG

## 2021-01-05 RX ORDER — CEFAZOLIN SODIUM IN 0.9 % NACL 3 G/100 ML
3 INTRAVENOUS SOLUTION, PIGGYBACK (ML) INTRAVENOUS ONCE
Status: DISCONTINUED | OUTPATIENT
Start: 2021-01-05 | End: 2021-01-05 | Stop reason: HOSPADM

## 2021-01-05 RX ORDER — DEXAMETHASONE SODIUM PHOSPHATE 4 MG/ML
INJECTION, SOLUTION INTRA-ARTICULAR; INTRALESIONAL; INTRAMUSCULAR; INTRAVENOUS; SOFT TISSUE
Status: COMPLETED | OUTPATIENT
Start: 2021-01-05 | End: 2021-01-05

## 2021-01-05 RX ORDER — LIDOCAINE HYDROCHLORIDE 10 MG/ML
0.5 INJECTION, SOLUTION EPIDURAL; INFILTRATION; INTRACAUDAL; PERINEURAL ONCE AS NEEDED
Status: DISCONTINUED | OUTPATIENT
Start: 2021-01-05 | End: 2021-01-05 | Stop reason: HOSPADM

## 2021-01-05 RX ORDER — FLUMAZENIL 0.1 MG/ML
0.1 INJECTION INTRAVENOUS AS NEEDED
Status: DISCONTINUED | OUTPATIENT
Start: 2021-01-05 | End: 2021-01-05 | Stop reason: HOSPADM

## 2021-01-05 RX ORDER — NALOXONE HCL 0.4 MG/ML
0.4 VIAL (ML) INJECTION AS NEEDED
Status: DISCONTINUED | OUTPATIENT
Start: 2021-01-05 | End: 2021-01-05 | Stop reason: HOSPADM

## 2021-01-05 RX ORDER — ONDANSETRON 2 MG/ML
4 INJECTION INTRAMUSCULAR; INTRAVENOUS ONCE AS NEEDED
Status: DISCONTINUED | OUTPATIENT
Start: 2021-01-05 | End: 2021-01-05 | Stop reason: HOSPADM

## 2021-01-05 RX ORDER — MIDAZOLAM HYDROCHLORIDE 1 MG/ML
INJECTION INTRAMUSCULAR; INTRAVENOUS
Status: COMPLETED | OUTPATIENT
Start: 2021-01-05 | End: 2021-01-05

## 2021-01-05 RX ORDER — ROPIVACAINE HYDROCHLORIDE 5 MG/ML
INJECTION, SOLUTION EPIDURAL; INFILTRATION; PERINEURAL
Status: COMPLETED | OUTPATIENT
Start: 2021-01-05 | End: 2021-01-05

## 2021-01-05 RX ORDER — FENTANYL CITRATE 50 UG/ML
50 INJECTION, SOLUTION INTRAMUSCULAR; INTRAVENOUS
Status: DISCONTINUED | OUTPATIENT
Start: 2021-01-05 | End: 2021-01-05 | Stop reason: HOSPADM

## 2021-01-05 RX ORDER — EPHEDRINE SULFATE/0.9% NACL/PF 25 MG/5 ML
SYRINGE (ML) INTRAVENOUS AS NEEDED
Status: DISCONTINUED | OUTPATIENT
Start: 2021-01-05 | End: 2021-01-05 | Stop reason: SURG

## 2021-01-05 RX ORDER — ACETAMINOPHEN 650 MG/1
650 SUPPOSITORY RECTAL ONCE AS NEEDED
Status: DISCONTINUED | OUTPATIENT
Start: 2021-01-05 | End: 2021-01-05 | Stop reason: HOSPADM

## 2021-01-05 RX ORDER — PHENYLEPHRINE HCL IN 0.9% NACL 0.5 MG/5ML
SYRINGE (ML) INTRAVENOUS AS NEEDED
Status: DISCONTINUED | OUTPATIENT
Start: 2021-01-05 | End: 2021-01-05 | Stop reason: SURG

## 2021-01-05 RX ORDER — DEXAMETHASONE SODIUM PHOSPHATE 4 MG/ML
INJECTION, SOLUTION INTRA-ARTICULAR; INTRALESIONAL; INTRAMUSCULAR; INTRAVENOUS; SOFT TISSUE AS NEEDED
Status: DISCONTINUED | OUTPATIENT
Start: 2021-01-05 | End: 2021-01-05 | Stop reason: SURG

## 2021-01-05 RX ORDER — SODIUM CHLORIDE, SODIUM LACTATE, POTASSIUM CHLORIDE, CALCIUM CHLORIDE 600; 310; 30; 20 MG/100ML; MG/100ML; MG/100ML; MG/100ML
9 INJECTION, SOLUTION INTRAVENOUS CONTINUOUS PRN
Status: DISCONTINUED | OUTPATIENT
Start: 2021-01-05 | End: 2021-01-05 | Stop reason: HOSPADM

## 2021-01-05 RX ORDER — ONDANSETRON 2 MG/ML
INJECTION INTRAMUSCULAR; INTRAVENOUS AS NEEDED
Status: DISCONTINUED | OUTPATIENT
Start: 2021-01-05 | End: 2021-01-05 | Stop reason: SURG

## 2021-01-05 RX ORDER — SODIUM CHLORIDE 0.9 % (FLUSH) 0.9 %
3-10 SYRINGE (ML) INJECTION AS NEEDED
Status: DISCONTINUED | OUTPATIENT
Start: 2021-01-05 | End: 2021-01-05 | Stop reason: HOSPADM

## 2021-01-05 RX ORDER — SODIUM CHLORIDE 0.9 % (FLUSH) 0.9 %
3 SYRINGE (ML) INJECTION EVERY 12 HOURS SCHEDULED
Status: DISCONTINUED | OUTPATIENT
Start: 2021-01-05 | End: 2021-01-05 | Stop reason: HOSPADM

## 2021-01-05 RX ORDER — IPRATROPIUM BROMIDE AND ALBUTEROL SULFATE 2.5; .5 MG/3ML; MG/3ML
3 SOLUTION RESPIRATORY (INHALATION) ONCE AS NEEDED
Status: DISCONTINUED | OUTPATIENT
Start: 2021-01-05 | End: 2021-01-05 | Stop reason: HOSPADM

## 2021-01-05 RX ORDER — FENTANYL CITRATE 50 UG/ML
INJECTION, SOLUTION INTRAMUSCULAR; INTRAVENOUS AS NEEDED
Status: DISCONTINUED | OUTPATIENT
Start: 2021-01-05 | End: 2021-01-05 | Stop reason: SURG

## 2021-01-05 RX ORDER — ACETAMINOPHEN 325 MG/1
650 TABLET ORAL ONCE AS NEEDED
Status: DISCONTINUED | OUTPATIENT
Start: 2021-01-05 | End: 2021-01-05 | Stop reason: HOSPADM

## 2021-01-05 RX ORDER — HYDROCODONE BITARTRATE AND ACETAMINOPHEN 7.5; 325 MG/1; MG/1
2 TABLET ORAL EVERY 4 HOURS PRN
Status: DISCONTINUED | OUTPATIENT
Start: 2021-01-05 | End: 2021-01-05 | Stop reason: HOSPADM

## 2021-01-05 RX ORDER — MEPERIDINE HYDROCHLORIDE 25 MG/ML
12.5 INJECTION INTRAMUSCULAR; INTRAVENOUS; SUBCUTANEOUS
Status: DISCONTINUED | OUTPATIENT
Start: 2021-01-05 | End: 2021-01-05 | Stop reason: HOSPADM

## 2021-01-05 RX ORDER — PROPOFOL 10 MG/ML
VIAL (ML) INTRAVENOUS AS NEEDED
Status: DISCONTINUED | OUTPATIENT
Start: 2021-01-05 | End: 2021-01-05 | Stop reason: SURG

## 2021-01-05 RX ORDER — DIPHENHYDRAMINE HYDROCHLORIDE 50 MG/ML
12.5 INJECTION INTRAMUSCULAR; INTRAVENOUS
Status: DISCONTINUED | OUTPATIENT
Start: 2021-01-05 | End: 2021-01-05 | Stop reason: HOSPADM

## 2021-01-05 RX ADMIN — LIDOCAINE HYDROCHLORIDE 50 MG: 10 INJECTION, SOLUTION EPIDURAL; INFILTRATION; INTRACAUDAL; PERINEURAL at 10:47

## 2021-01-05 RX ADMIN — DEXAMETHASONE SODIUM PHOSPHATE 4 MG: 4 INJECTION, SOLUTION INTRAMUSCULAR; INTRAVENOUS at 09:27

## 2021-01-05 RX ADMIN — ROPIVACAINE HYDROCHLORIDE 30 ML: 5 INJECTION, SOLUTION EPIDURAL; INFILTRATION; PERINEURAL at 09:27

## 2021-01-05 RX ADMIN — FENTANYL CITRATE 100 MCG: 50 INJECTION, SOLUTION INTRAMUSCULAR; INTRAVENOUS at 10:47

## 2021-01-05 RX ADMIN — PROPOFOL 150 MG: 10 INJECTION, EMULSION INTRAVENOUS at 10:48

## 2021-01-05 RX ADMIN — ONDANSETRON 4 MG: 2 INJECTION INTRAMUSCULAR; INTRAVENOUS at 11:27

## 2021-01-05 RX ADMIN — Medication 10 MG: at 11:05

## 2021-01-05 RX ADMIN — DEXAMETHASONE SODIUM PHOSPHATE 4 MG: 4 INJECTION, SOLUTION INTRAMUSCULAR; INTRAVENOUS at 11:27

## 2021-01-05 RX ADMIN — SODIUM CHLORIDE, POTASSIUM CHLORIDE, SODIUM LACTATE AND CALCIUM CHLORIDE 9 ML/HR: 600; 310; 30; 20 INJECTION, SOLUTION INTRAVENOUS at 09:34

## 2021-01-05 RX ADMIN — Medication 15 MG: at 11:13

## 2021-01-05 RX ADMIN — CEFAZOLIN SODIUM 2 G: 1 INJECTION, POWDER, FOR SOLUTION INTRAMUSCULAR; INTRAVENOUS at 10:50

## 2021-01-05 RX ADMIN — Medication 0.2 MG: at 11:15

## 2021-01-05 RX ADMIN — Medication 5 MG: at 10:58

## 2021-01-05 RX ADMIN — Medication 0.2 MG: at 11:24

## 2021-01-05 RX ADMIN — MIDAZOLAM 2 MG: 1 INJECTION INTRAMUSCULAR; INTRAVENOUS at 09:27

## 2021-01-05 RX ADMIN — PHENYLEPHRINE HYDROCHLORIDE 50 MCG: 10 INJECTION INTRAVENOUS at 11:18

## 2021-01-05 RX ADMIN — PROPOFOL 200 MG: 10 INJECTION, EMULSION INTRAVENOUS at 10:47

## 2021-01-05 NOTE — OP NOTE
TENNIS ELBOW RELEASE LATERAL EPICONDYLAR RELEASE  Procedure Report    Patient Name:  Martinez Cobb  YOB: 1980    Date of Surgery:  1/5/2021     Indications: This is a 40 y.o. male with pain to the left elbow.  Workup demonstrated lateral epicondylitis that was not improved with conservative treatment.Treatment options were discussed.  They desired to proceed with tennis elbow repair after discussing the risks including bleeding, scarring,infection, stiffness, nerve damage, tendon damage, artery damage, continued pain, DVT, fracture, loss of life or limb, and a need for further surgery including hardware removal.      Pre-op Diagnosis:   Left lateral epicondylitis [M77.12]    Postop Diagnosis       Post-Op Diagnosis Codes:     * Left lateral epicondylitis [M77.12]    Procedure/CPT® Codes:  64921    Procedure    Procedure(s):  TENNIS ELBOW RELEASE LATERAL EPICONDYLAR REPAIR    Assistant: Vito Kuhn certified first assist    was responsible for performing the following activities: Retraction, Suction, Irrigation, Suturing, Closing and Placing Dressing and their skilled assistance was necessary for the success of this case.         Anesthesia: General with Block    IVF: See anesthesia record    Estimated Blood Loss: minimal    Implants:    Implant Name Type Inv. Item Serial No.  Lot No. LRB No. Used Action   SUT/ANCH BIOCOMP SUTTAK W/ NO2 TW 3X14.5MM - [id number] Implant SUT/ANCH BIOCOMP SUTTAK W/ NO2 TW 3X14.5MM  [employer] [id number] Left 1 Implanted   SUT CONTRL TISS STRATAFIX SPIRAL MNCRYL UD 3/0 PLS 30CM - [id number] Implant SUT CONTRL TISS STRATAFIX SPIRAL MNCRYL UD 3/0 PLS 30CM  [employer]  [employer]  Left 1 Implanted         Complications: None    Specimens:none    Tourniquet: 25 minutes    Description of Procedure: The patient's operative site was marked.  Regional  anesthesia was administered.  Patient was brought to the operating room and placed on the operating room  table.  General anesthesia was administered. Antibiotics were dosed.  A timeout was taken to confirm the correct operative site and procedure.    The arm was then prepped and draped in a standard surgical fashion.    Incision was marked over the lateral condyle, full-thickness flaps raised after exsanguinating the arm and the common extensor fascia opened.  Sharp dissection was carried down over the anterior aspect of the epicondyle and the superficial tendon elevated revealing underlying tendinopathy excised with sharp dissection.  Rasp and a drill used to create a bleeding surface on the epicondyle and an anchor placed.  Sutures were placed through the remaining tendon and tied this back down to the bone.  Fascia was closed with Vicryl and the skin was closed after irrigation with suture and glue and a sterile dressing applied.    There was good capillary refill.  All counts were correct.  No complications.  Taken to recovery room in satisfactory condition        Karson Mcpherson MD     Date: 1/5/2021  Time: 11:40 EST

## 2021-01-05 NOTE — ANESTHESIA PREPROCEDURE EVALUATION
Anesthesia Evaluation     Patient summary reviewed and Nursing notes reviewed   NPO Solid Status: > 6 hours  NPO Liquid Status: > 6 hours           Airway   Mallampati: II  TM distance: >3 FB  Neck ROM: full  No difficulty expected  Dental - normal exam     Pulmonary - negative pulmonary ROS and normal exam    breath sounds clear to auscultation  Cardiovascular - negative cardio ROS and normal exam    ECG reviewed  Rhythm: regular  Rate: normal        Neuro/Psych- negative ROS  GI/Hepatic/Renal/Endo - negative ROS     Musculoskeletal     Abdominal  - normal exam    Abdomen: soft.  Bowel sounds: normal.   Substance History - negative use     OB/GYN negative ob/gyn ROS         Other   arthritis,                      Anesthesia Plan    ASA 2     general with block     intravenous induction     Anesthetic plan, all risks, benefits, and alternatives have been provided, discussed and informed consent has been obtained with: patient.  Use of blood products discussed with patient .   Plan discussed with CAA.

## 2021-01-05 NOTE — ANESTHESIA PROCEDURE NOTES
Peripheral Block      Patient reassessed immediately prior to procedure    Patient location during procedure: pre-op  Reason for block: procedure for pain, at surgeon's request and post-op pain management  Performed by  Anesthesiologist: Dominic Drake MD  Assisted by: Jose Roberto Che RN  Preanesthetic Checklist  Completed: patient identified, site marked, surgical consent, pre-op evaluation, timeout performed, IV checked, risks and benefits discussed and monitors and equipment checked  Prep:  Pt Position: supine  Sterile barriers:cap, gloves, sterile barriers, mask and gown  Prep: ChloraPrep  Patient monitoring: blood pressure monitoring, continuous pulse oximetry and EKG  Procedure  Sedation:yes  Performed under: local infiltration  Guidance:ultrasound guided  ULTRASOUND INTERPRETATION. Using ultrasound guidance a 20 G gauge needle was placed in close proximity to the nerve, at which point, under ultrasound guidance anesthetic was injected in the area of the nerve and spread of the anesthesia was seen on ultrasound in close proximity thereto.  There were no abnormalities seen on ultrasound; a digital image was taken; and the patient tolerated the procedure with no complications. Images:still images obtained, printed/placed on chart    Laterality:left  Block Type:supraclavicular  Injection Technique:single-shot  Needle Type:echogenic  Needle Gauge:20 G  Resistance on Injection: less than 15 psi  Sedation medications used: midazolam (VERSED) injection, 2 mg  Medications Used: ropivacaine (NAROPIN) 0.5 % injection, 30 mL  dexamethasone (DECADRON) injection, 4 mg      Post Assessment  Injection Assessment: negative aspiration for heme, no paresthesia on injection and incremental injection  Patient Tolerance:comfortable throughout block  Complications:no

## 2021-01-05 NOTE — ANESTHESIA POSTPROCEDURE EVALUATION
Patient: Martinez Cobb    Procedure Summary     Date: 01/05/21 Room / Location: [facility] OR [address] [place] [address] MAIN OR    Anesthesia Start: 1042 Anesthesia Stop: 1143    Procedure: TENNIS ELBOW RELEASE LATERAL EPICONDYLAR REPAIR (Left ) Diagnosis:       Left lateral epicondylitis      (Left lateral epicondylitis [M77.12])    Surgeon: Karson Mcpherson MD Provider: Dominic Drake MD    Anesthesia Type: general with block ASA Status: 2          Anesthesia Type: general with block    Vitals  Vitals Value Taken Time   /61 01/05/21 1146   Temp 98 °F (36.7 °C) 01/05/21 1141   Pulse 70 01/05/21 1146   Resp 16 01/05/21 1146   SpO2 96 % 01/05/21 1146   Vitals shown include unvalidated device data.        Post Anesthesia Care and Evaluation    Patient location during evaluation: bedside  Patient participation: complete - patient participated  Level of consciousness: awake and alert  Pain score: 1  Pain management: adequate  Airway patency: patent  Anesthetic complications: No anesthetic complications  PONV Status: none  Cardiovascular status: acceptable  Respiratory status: acceptable  Hydration status: acceptable  Post Neuraxial Block status: Motor and sensory function returned to baseline

## 2021-01-20 ENCOUNTER — OFFICE VISIT (OUTPATIENT)
Dept: ORTHOPEDIC SURGERY | Facility: CLINIC | Age: 41
End: 2021-01-20

## 2021-01-20 VITALS
SYSTOLIC BLOOD PRESSURE: 109 MMHG | HEART RATE: 70 BPM | WEIGHT: 253 LBS | DIASTOLIC BLOOD PRESSURE: 71 MMHG | HEIGHT: 78 IN | BODY MASS INDEX: 29.27 KG/M2

## 2021-01-20 DIAGNOSIS — Z47.89 ORTHOPEDIC AFTERCARE: Primary | ICD-10-CM

## 2021-01-20 PROCEDURE — 99024 POSTOP FOLLOW-UP VISIT: CPT | Performed by: ORTHOPAEDIC SURGERY

## 2021-01-20 NOTE — PROGRESS NOTES
"     Patient ID: Martinez Cobb is a 40 y.o. male.    1/5/21 left tennis elbow repair  Pain minimal    Objective:    /71   Pulse 70   Ht 198.1 cm (78\")   Wt 115 kg (253 lb)   BMI 29.24 kg/m²     Physical Examination:    Incision is healed over range of motion 0 to 125 degrees with full digit range of motion.  Sensory and motor exam are intact all distributions. Radial pulse is palpable and capillary refill is less than two seconds to all digits    Imaging:      Assessment:  Doing well after tennis elbow repair    Plan:  Lifting restrictions discussed, see me in a month    Procedures  "

## 2021-02-17 ENCOUNTER — OFFICE VISIT (OUTPATIENT)
Dept: ORTHOPEDIC SURGERY | Facility: CLINIC | Age: 41
End: 2021-02-17

## 2021-02-17 VITALS
HEIGHT: 78 IN | SYSTOLIC BLOOD PRESSURE: 116 MMHG | DIASTOLIC BLOOD PRESSURE: 82 MMHG | WEIGHT: 253 LBS | BODY MASS INDEX: 29.27 KG/M2 | HEART RATE: 54 BPM

## 2021-02-17 DIAGNOSIS — Z47.89 ORTHOPEDIC AFTERCARE: Primary | ICD-10-CM

## 2021-02-17 PROCEDURE — 99024 POSTOP FOLLOW-UP VISIT: CPT | Performed by: ORTHOPAEDIC SURGERY

## 2021-02-17 NOTE — PROGRESS NOTES
Patient ID: Martinez Cobb is a 40 y.o. male.    1/5/21 left tennis elbow repair  Pain minimal    Objective:    There were no vitals taken for this visit.    Physical Examination:  Incision healed, is warm to motion the elbow, wrist, and forearm.       Imaging:      Assessment:  Doing well after tennis elbow repair    Plan:  Continue lifting restrictions for another couple weeks and begin  strengthening and other lifting progressively and see me in a month    Procedures

## 2021-03-17 ENCOUNTER — OFFICE VISIT (OUTPATIENT)
Dept: ORTHOPEDIC SURGERY | Facility: CLINIC | Age: 41
End: 2021-03-17

## 2021-03-17 VITALS
BODY MASS INDEX: 29.27 KG/M2 | HEIGHT: 78 IN | HEART RATE: 67 BPM | SYSTOLIC BLOOD PRESSURE: 130 MMHG | WEIGHT: 253 LBS | DIASTOLIC BLOOD PRESSURE: 75 MMHG

## 2021-03-17 DIAGNOSIS — Z47.89 ORTHOPEDIC AFTERCARE: Primary | ICD-10-CM

## 2021-03-17 PROCEDURE — 99024 POSTOP FOLLOW-UP VISIT: CPT | Performed by: ORTHOPAEDIC SURGERY

## 2021-03-17 NOTE — PROGRESS NOTES
"     Patient ID: Martinez Cobb is a 40 y.o. male.    1/5/21 left tennis elbow repair  Pain resolved    Objective:    /75   Pulse 67   Ht 198.1 cm (78\")   Wt 115 kg (253 lb)   BMI 29.24 kg/m²     Physical Examination:  Left elbow demonstrates intact skin with no areas of tenderness, has full range of motion the elbow with no pain on resisted wrist extension       Imaging:      Assessment:  Doing well after tennis elbow repair    Plan:  Activity as tolerated and see me as needed    Procedures  "

## 2021-12-07 ENCOUNTER — LAB (OUTPATIENT)
Dept: FAMILY MEDICINE CLINIC | Facility: CLINIC | Age: 41
End: 2021-12-07

## 2021-12-07 ENCOUNTER — OFFICE VISIT (OUTPATIENT)
Dept: FAMILY MEDICINE CLINIC | Facility: CLINIC | Age: 41
End: 2021-12-07

## 2021-12-07 VITALS
SYSTOLIC BLOOD PRESSURE: 125 MMHG | WEIGHT: 252 LBS | OXYGEN SATURATION: 98 % | TEMPERATURE: 98 F | HEIGHT: 78 IN | BODY MASS INDEX: 29.16 KG/M2 | HEART RATE: 68 BPM | DIASTOLIC BLOOD PRESSURE: 85 MMHG

## 2021-12-07 DIAGNOSIS — F41.1 ANXIETY STATE: Primary | ICD-10-CM

## 2021-12-07 DIAGNOSIS — F41.1 ANXIETY STATE: ICD-10-CM

## 2021-12-07 PROBLEM — M25.572 ANKLE PAIN, LEFT: Status: ACTIVE | Noted: 2017-01-25

## 2021-12-07 PROBLEM — G57.52 TARSAL TUNNEL SYNDROME, LEFT: Status: ACTIVE | Noted: 2017-12-27

## 2021-12-07 PROBLEM — M79.672 PAIN OF LEFT HEEL: Status: ACTIVE | Noted: 2017-01-23

## 2021-12-07 PROBLEM — F32.A DEPRESSION: Status: ACTIVE | Noted: 2021-12-07

## 2021-12-07 PROBLEM — M84.375A STRESS FRACTURE, LEFT FOOT, INITIAL ENCOUNTER FOR FRACTURE: Status: ACTIVE | Noted: 2017-10-23

## 2021-12-07 LAB
ALBUMIN SERPL-MCNC: 4.5 G/DL (ref 3.5–5.2)
ALBUMIN/GLOB SERPL: 1.4 G/DL
ALP SERPL-CCNC: 75 U/L (ref 39–117)
ALT SERPL W P-5'-P-CCNC: 23 U/L (ref 1–41)
ANION GAP SERPL CALCULATED.3IONS-SCNC: 8.1 MMOL/L (ref 5–15)
AST SERPL-CCNC: 24 U/L (ref 1–40)
BASOPHILS # BLD AUTO: 0.04 10*3/MM3 (ref 0–0.2)
BASOPHILS NFR BLD AUTO: 0.7 % (ref 0–1.5)
BILIRUB SERPL-MCNC: 0.4 MG/DL (ref 0–1.2)
BUN SERPL-MCNC: 14 MG/DL (ref 6–20)
BUN/CREAT SERPL: 10.5 (ref 7–25)
CALCIUM SPEC-SCNC: 9.5 MG/DL (ref 8.6–10.5)
CHLORIDE SERPL-SCNC: 102 MMOL/L (ref 98–107)
CO2 SERPL-SCNC: 27.9 MMOL/L (ref 22–29)
CREAT SERPL-MCNC: 1.33 MG/DL (ref 0.76–1.27)
DEPRECATED RDW RBC AUTO: 43.7 FL (ref 37–54)
EOSINOPHIL # BLD AUTO: 0.1 10*3/MM3 (ref 0–0.4)
EOSINOPHIL NFR BLD AUTO: 1.8 % (ref 0.3–6.2)
ERYTHROCYTE [DISTWIDTH] IN BLOOD BY AUTOMATED COUNT: 13.1 % (ref 12.3–15.4)
GFR SERPL CREATININE-BSD FRML MDRD: 59 ML/MIN/1.73
GLOBULIN UR ELPH-MCNC: 3.3 GM/DL
GLUCOSE SERPL-MCNC: 89 MG/DL (ref 65–99)
HCT VFR BLD AUTO: 43.2 % (ref 37.5–51)
HGB BLD-MCNC: 15.2 G/DL (ref 13–17.7)
IMM GRANULOCYTES # BLD AUTO: 0.01 10*3/MM3 (ref 0–0.05)
IMM GRANULOCYTES NFR BLD AUTO: 0.2 % (ref 0–0.5)
LYMPHOCYTES # BLD AUTO: 0.97 10*3/MM3 (ref 0.7–3.1)
LYMPHOCYTES NFR BLD AUTO: 17.5 % (ref 19.6–45.3)
MCH RBC QN AUTO: 32.5 PG (ref 26.6–33)
MCHC RBC AUTO-ENTMCNC: 35.2 G/DL (ref 31.5–35.7)
MCV RBC AUTO: 92.5 FL (ref 79–97)
MONOCYTES # BLD AUTO: 0.73 10*3/MM3 (ref 0.1–0.9)
MONOCYTES NFR BLD AUTO: 13.2 % (ref 5–12)
NEUTROPHILS NFR BLD AUTO: 3.68 10*3/MM3 (ref 1.7–7)
NEUTROPHILS NFR BLD AUTO: 66.6 % (ref 42.7–76)
NRBC BLD AUTO-RTO: 0 /100 WBC (ref 0–0.2)
PLATELET # BLD AUTO: 181 10*3/MM3 (ref 140–450)
PMV BLD AUTO: 10.9 FL (ref 6–12)
POTASSIUM SERPL-SCNC: 4.3 MMOL/L (ref 3.5–5.2)
PROT SERPL-MCNC: 7.8 G/DL (ref 6–8.5)
RBC # BLD AUTO: 4.67 10*6/MM3 (ref 4.14–5.8)
SODIUM SERPL-SCNC: 138 MMOL/L (ref 136–145)
TSH SERPL DL<=0.05 MIU/L-ACNC: 1.59 UIU/ML (ref 0.27–4.2)
WBC NRBC COR # BLD: 5.53 10*3/MM3 (ref 3.4–10.8)

## 2021-12-07 PROCEDURE — 80053 COMPREHEN METABOLIC PANEL: CPT | Performed by: FAMILY MEDICINE

## 2021-12-07 PROCEDURE — 84443 ASSAY THYROID STIM HORMONE: CPT | Performed by: FAMILY MEDICINE

## 2021-12-07 PROCEDURE — 36415 COLL VENOUS BLD VENIPUNCTURE: CPT | Performed by: FAMILY MEDICINE

## 2021-12-07 PROCEDURE — 85025 COMPLETE CBC W/AUTO DIFF WBC: CPT | Performed by: FAMILY MEDICINE

## 2021-12-07 PROCEDURE — 99214 OFFICE O/P EST MOD 30 MIN: CPT | Performed by: FAMILY MEDICINE

## 2021-12-07 RX ORDER — ESCITALOPRAM OXALATE 10 MG/1
10 TABLET ORAL DAILY
Qty: 90 TABLET | Refills: 1 | Status: SHIPPED | OUTPATIENT
Start: 2021-12-07 | End: 2021-12-28 | Stop reason: SINTOL

## 2021-12-07 RX ORDER — ESCITALOPRAM OXALATE 10 MG/1
10 TABLET ORAL DAILY
Qty: 30 TABLET | Refills: 3 | Status: SHIPPED | OUTPATIENT
Start: 2021-12-07 | End: 2021-12-07

## 2021-12-07 NOTE — PROGRESS NOTES
Subjective   Martinez Cobb is a 41 y.o. male.     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  Anxious  Not sleeping  About 2 hours a night  Has an appt with [facility] on Thurs  Affecting work and home  Last day worked was Sat  Left early sec to on outburst sec to a crew not doing their work  He has never done this in the past  Feels like it started a few years ago but increased in severity in the last few months  Drinking 3 cases of beer a week  No beer since Thurs   with 2 children  CaGe  Increased outbursts at home  Denies SI/HI  He has not felt like he wanted to hurt anybody or himself  He is having nightmares that often involve his children           The following portions of the patient's history were reviewed and updated as appropriate: allergies, current medications, past family history, past medical history, past social history, past surgical history, and problem list.  Past Medical History:   Diagnosis Date   • Tennis elbow     left     Past Surgical History:   Procedure Laterality Date   • FOOT SURGERY Bilateral    • LATERAL EPICONDYLE RELEASE Left 2021    Procedure: TENNIS ELBOW RELEASE LATERAL EPICONDYLAR REPAIR;  Surgeon: Karson Mcpherson MD;  Location: [facility] OR;  Service: Orthopedics;  Laterality: Left;   • SHOULDER SURGERY Bilateral      Family History   Problem Relation Age of Onset   • Diabetes Father    • Hypertension Father    • Hyperlipidemia Father    • Heart disease Father      Social History     Socioeconomic History   • Marital status:    Tobacco Use   • Smoking status: Former Smoker     Types: Cigarettes     Quit date: 2012     Years since quittin.3   • Smokeless tobacco: Current User   Vaping Use   • Vaping Use: Never used   Substance and Sexual Activity   • Alcohol use: Yes     Alcohol/week: 3.0 standard drinks     Types: 3 Cans of beer per week   • Drug use: No   • Sexual  "activity: Defer         Current Outpatient Medications:   •  escitalopram (Lexapro) 10 MG tablet, Take 1 tablet by mouth Daily., Disp: 30 tablet, Rfl: 3    Review of Systems   Constitutional: Negative.    Respiratory: Negative.    Cardiovascular: Negative.    Gastrointestinal: Negative for nausea and vomiting.   Neurological: Negative for dizziness, syncope, light-headedness and headache.   Psychiatric/Behavioral: Positive for agitation, behavioral problems, sleep disturbance and stress. Negative for self-injury and suicidal ideas. The patient is nervous/anxious.      /85 (BP Location: Left arm, Patient Position: Sitting, Cuff Size: Large Adult)   Pulse 68   Temp 98 °F (36.7 °C) (Temporal)   Ht 198.1 cm (78\")   Wt 114 kg (252 lb)   SpO2 98%   BMI 29.12 kg/m²       Objective   Physical Exam  Vitals and nursing note reviewed.   Constitutional:       General: He is not in acute distress.     Appearance: He is well-developed and well-groomed.   HENT:      Head: Normocephalic and atraumatic.   Neck:      Thyroid: No thyromegaly.   Cardiovascular:      Rate and Rhythm: Normal rate and regular rhythm.      Heart sounds: Normal heart sounds. No murmur heard.  No friction rub. No gallop.    Pulmonary:      Effort: Pulmonary effort is normal. No respiratory distress.      Breath sounds: Normal breath sounds. No wheezing or rales.   Musculoskeletal:      Cervical back: Neck supple.      Right lower leg: No edema.      Left lower leg: No edema.   Lymphadenopathy:      Cervical: No cervical adenopathy.   Skin:     General: Skin is warm and dry.   Neurological:      Mental Status: He is alert.   Psychiatric:         Mood and Affect: Mood is anxious.         Speech: Speech is rapid and pressured.         Behavior: Behavior is cooperative.         Thought Content: Thought content normal.           Assessment/Plan   Problems Addressed this Visit     None      Visit Diagnoses     Anxiety state    -  Primary    Relevant " Orders    Comprehensive Metabolic Panel    CBC & Differential    TSH    Ambulatory Referral to Psychology      Diagnoses       Codes Comments    Anxiety state    -  Primary ICD-10-CM: F41.1  ICD-9-CM: 300.00         He was counseled on the need to decrease or eliminate his alcohol use  He answered positive to cut back and guilt on the CAGE questionnaire  He was strongly encouraged to decrease her eliminate his caffeine consumption  I have ordered labs including a CMP, CBC, TSH  He will go to his first [facility] appointment this week and he is entitled to 6 through work  I have ordered psychology/counseling appointments to start soon as we can get those arranged so that he can  right where he leaves off with the [facility]  I will put him out of work with a return to work date of January 3 which is a Monday  He will follow up with Dr. Mariano or me prior to that date  He was encouraged to exercise dose to help with his overall sense of wellbeing  I will complete his FMLA papers  30 minutes were spent with the patient reviewing his previous records, doing his exam and history in the room and ordering his blood work and counseling referral and completing his chart

## 2021-12-07 NOTE — PATIENT INSTRUCTIONS
Decrease caffeine consumption  Decrease or eliminate alcohol consumption  Exercise  Go to ER is symptoms worsen  Keep the [facility] appointments

## 2021-12-09 ENCOUNTER — TELEPHONE (OUTPATIENT)
Dept: FAMILY MEDICINE CLINIC | Facility: CLINIC | Age: 41
End: 2021-12-09

## 2021-12-28 ENCOUNTER — OFFICE VISIT (OUTPATIENT)
Dept: FAMILY MEDICINE CLINIC | Facility: CLINIC | Age: 41
End: 2021-12-28

## 2021-12-28 VITALS
HEART RATE: 77 BPM | WEIGHT: 259 LBS | OXYGEN SATURATION: 97 % | HEIGHT: 78 IN | DIASTOLIC BLOOD PRESSURE: 82 MMHG | BODY MASS INDEX: 29.97 KG/M2 | SYSTOLIC BLOOD PRESSURE: 123 MMHG | TEMPERATURE: 99.3 F

## 2021-12-28 DIAGNOSIS — F41.1 ANXIETY STATE: Primary | ICD-10-CM

## 2021-12-28 PROCEDURE — 99213 OFFICE O/P EST LOW 20 MIN: CPT | Performed by: FAMILY MEDICINE

## 2021-12-28 RX ORDER — FLUOXETINE HYDROCHLORIDE 20 MG/1
20 CAPSULE ORAL DAILY
Qty: 30 CAPSULE | Refills: 1 | Status: SHIPPED | OUTPATIENT
Start: 2021-12-28 | End: 2022-10-12

## 2021-12-28 NOTE — PATIENT INSTRUCTIONS
Continue counseling sessions  Continue to limit/avoid alcohol  Continue to exercise  Take the new medication  Call if you have any problems with the medication

## 2021-12-28 NOTE — PROGRESS NOTES
Subjective   Martinez Cobb is a 41 y.o. male.     Here for follow-up on anxiety after he was put out of work  He took the Lexapro I started him on for about a week  He developed headaches and did not feel well so he stopped taking them but never called  He is seeing counselor through [facility]  Wakes after about 45 min and can sometimes fall back asleep but will sometimes not  No alcohol in 3 weeks   Has appts every Thurs scheduled  He has been at home with a 7 and 9-year-old children who have been out of school for the holidays  He is exercising at least twice a week  Denies any suicidal or homicidal ideation       The following portions of the patient's history were reviewed and updated as appropriate: allergies, current medications, past family history, past medical history, past social history, past surgical history, and problem list.  Past Medical History:   Diagnosis Date   • Tennis elbow     left     Past Surgical History:   Procedure Laterality Date   • FOOT SURGERY Bilateral    • LATERAL EPICONDYLE RELEASE Left 2021    Procedure: TENNIS ELBOW RELEASE LATERAL EPICONDYLAR REPAIR;  Surgeon: Karson Mcpherson MD;  Location: [facility] OR;  Service: Orthopedics;  Laterality: Left;   • SHOULDER SURGERY Bilateral      Family History   Problem Relation Age of Onset   • Diabetes Father    • Hypertension Father    • Hyperlipidemia Father    • Heart disease Father      Social History     Socioeconomic History   • Marital status:    Tobacco Use   • Smoking status: Former Smoker     Types: Cigarettes     Quit date: 2012     Years since quittin.3   • Smokeless tobacco: Current User   Vaping Use   • Vaping Use: Never used   Substance and Sexual Activity   • Alcohol use: Yes     Alcohol/week: 3.0 standard drinks     Types: 3 Cans of beer per week   • Drug use: No   • Sexual activity: Defer         Current Outpatient Medications:   •  FLUoxetine (PROzac) 20 MG capsule, Take 1 capsule by mouth Daily.,  "Disp: 30 capsule, Rfl: 1    Review of Systems   Constitutional: Negative.    Respiratory: Negative.    Cardiovascular: Negative.    Psychiatric/Behavioral: Positive for sleep disturbance and stress. Negative for depressed mood. The patient is nervous/anxious.      /82 (BP Location: Left arm, Patient Position: Sitting, Cuff Size: Large Adult)   Pulse 77   Temp 99.3 °F (37.4 °C) (Temporal)   Ht 198.1 cm (78\")   Wt 117 kg (259 lb)   SpO2 97%   BMI 29.93 kg/m²       Objective   Physical Exam  Vitals and nursing note reviewed.   Constitutional:       General: He is not in acute distress.     Appearance: He is well-developed.   HENT:      Head: Normocephalic and atraumatic.   Cardiovascular:      Rate and Rhythm: Normal rate and regular rhythm.      Heart sounds: Normal heart sounds. No murmur heard.  No friction rub. No gallop.    Pulmonary:      Effort: Pulmonary effort is normal. No respiratory distress.      Breath sounds: Normal breath sounds. No wheezing or rales.   Skin:     General: Skin is warm and dry.   Neurological:      Mental Status: He is alert.   Psychiatric:         Attention and Perception: Attention normal.           Assessment/Plan   Problems Addressed this Visit     None      Visit Diagnoses     Anxiety state    -  Primary      Diagnoses       Codes Comments    Anxiety state    -  Primary ICD-10-CM: F41.1  ICD-9-CM: 300.00           He will start prozac 20mg and will call if he has problems with the medication  He is hesitant to take any meds for sleep  He will continue to see the counselor  Was encouraged to exercise  He was congratulated on his alcohol cessation  I will see him back in 3 weeks  I have given him a temporary return to work date of the 24th "

## 2022-10-12 ENCOUNTER — APPOINTMENT (OUTPATIENT)
Dept: GENERAL RADIOLOGY | Facility: HOSPITAL | Age: 42
End: 2022-10-12

## 2022-10-12 ENCOUNTER — OFFICE VISIT (OUTPATIENT)
Dept: ORTHOPEDIC SURGERY | Facility: CLINIC | Age: 42
End: 2022-10-12

## 2022-10-12 ENCOUNTER — HOSPITAL ENCOUNTER (EMERGENCY)
Facility: HOSPITAL | Age: 42
Discharge: HOME | End: 2022-10-12
Attending: EMERGENCY MEDICINE | Admitting: EMERGENCY MEDICINE

## 2022-10-12 VITALS
TEMPERATURE: 98.2 F | RESPIRATION RATE: 17 BRPM | WEIGHT: 264.55 LBS | BODY MASS INDEX: 30.61 KG/M2 | DIASTOLIC BLOOD PRESSURE: 78 MMHG | HEART RATE: 62 BPM | HEIGHT: 78 IN | SYSTOLIC BLOOD PRESSURE: 125 MMHG | OXYGEN SATURATION: 94 %

## 2022-10-12 VITALS — BODY MASS INDEX: 30.48 KG/M2 | WEIGHT: 263.4 LBS | HEART RATE: 79 BPM | HEIGHT: 78 IN

## 2022-10-12 DIAGNOSIS — M25.511 ACUTE PAIN OF RIGHT SHOULDER: Primary | ICD-10-CM

## 2022-10-12 DIAGNOSIS — M25.511 RIGHT SHOULDER PAIN, UNSPECIFIED CHRONICITY: Primary | ICD-10-CM

## 2022-10-12 PROCEDURE — 99283 EMERGENCY DEPT VISIT LOW MDM: CPT

## 2022-10-12 PROCEDURE — 73030 X-RAY EXAM OF SHOULDER: CPT

## 2022-10-12 PROCEDURE — 99213 OFFICE O/P EST LOW 20 MIN: CPT | Performed by: PHYSICIAN ASSISTANT

## 2022-10-12 NOTE — ED PROVIDER NOTES
"Subjective   History of Present Illness  Chief Complaint: \" I dislocated my shoulder\"      HPI: Patient is a 42-year-old male presents the ER today by private vehicle stating that he was stumbling this morning to put on a shirt when he fell striking his right shoulder.  States that he has dislocated this shoulder on several occasions in the past.  States that he has having some tingling in his fingers which is consistent with dislocation in the past.  He is having no numbness, decreased movement or sensation.    PCP: Burton         Review of Systems   Constitutional: Negative.    HENT: Negative.    Respiratory: Negative.    Cardiovascular: Negative.    Gastrointestinal: Negative.    Musculoskeletal: Positive for arthralgias and joint swelling.   Skin: Negative.    Hematological: Negative.    Psychiatric/Behavioral: Negative.        Past Medical History:   Diagnosis Date   • Tennis elbow     left       No Known Allergies    Past Surgical History:   Procedure Laterality Date   • FOOT SURGERY Bilateral    • LATERAL EPICONDYLE RELEASE Left 1/5/2021    Procedure: TENNIS ELBOW RELEASE LATERAL EPICONDYLAR REPAIR;  Surgeon: Karson Mcpherson MD;  Location: [facility] OR;  Service: Orthopedics;  Laterality: Left;   • SHOULDER SURGERY Bilateral        Family History   Problem Relation Age of Onset   • Diabetes Father    • Hypertension Father    • Hyperlipidemia Father    • Heart disease Father        Social History     Socioeconomic History   • Marital status:    Tobacco Use   • Smoking status: Former     Types: Cigarettes     Quit date: 8/13/2012     Years since quitting: 10.1   • Smokeless tobacco: Current   Vaping Use   • Vaping Use: Never used   Substance and Sexual Activity   • Alcohol use: Yes     Alcohol/week: 3.0 standard drinks     Types: 3 Cans of beer per week   • Drug use: No   • Sexual activity: Defer           Objective   Physical Exam  Vitals reviewed.   Constitutional:       Appearance: He " "is not ill-appearing or toxic-appearing.   HENT:      Head: Normocephalic.   Eyes:      Extraocular Movements: Extraocular movements intact.      Pupils: Pupils are equal, round, and reactive to light.   Cardiovascular:      Rate and Rhythm: Normal rate.      Pulses: Normal pulses.   Pulmonary:      Effort: Pulmonary effort is normal.   Musculoskeletal:         General: Tenderness present. No deformity.      Right shoulder: Tenderness present. No crepitus. Decreased range of motion. Normal strength.      Left shoulder: Normal.      Cervical back: Normal range of motion.      Comments: Distal neurovascular intact  Pain with abduction, decreased range of motion due to pain.     Skin:     General: Skin is warm and dry.      Capillary Refill: Capillary refill takes less than 2 seconds.      Findings: No bruising.   Neurological:      General: No focal deficit present.      Mental Status: He is alert and oriented to person, place, and time. Mental status is at baseline.      Motor: No weakness.   Psychiatric:         Mood and Affect: Mood normal.         Behavior: Behavior normal.         Thought Content: Thought content normal.         Judgment: Judgment normal.         Procedures           ED Course      /78   Pulse 62   Temp 98.2 °F (36.8 °C) (Oral)   Resp 17   Ht 198.1 cm (78\")   Wt 120 kg (264 lb 8.8 oz)   SpO2 94%   BMI 30.57 kg/m²   Labs Reviewed - No data to display  Medications - No data to display  XR Shoulder 2+ View Right    Result Date: 10/12/2022   1. Degenerative changes of the shoulder and AC joint.  No acute fracture.  Electronically Signed By-Joel Pryor MD On:10/12/2022 7:45 AM This report was finalized on [id number] by  Joel Pryor MD.                                         MDM  Number of Diagnoses or Management Options  Right shoulder pain, unspecified chronicity  Diagnosis management comments: While in the emergency room above evaluation was completed and imaging was " obtained.  X-rays were negative for dislocation or fracture.  Patient has repeatedly dislocated the shoulder in the past we discussed partial dislocation that may have reduced itself prior to his arrival.  Patient has established care with orthopedist, he was advised to follow up.  He was placed in a sling in the ED.  Patient gave verbal understanding of  Instructions.  He is alert oriented, nontoxic in appearance at time of discharge.  Denied need for pain medication.  He denied further questions.     I spoke with the patient at the bedside regarding their plan of care, discharge instruction, home care, prescriptions, indications to return to the emergency department, and importance follow-up.  We discussed test results at the bedside, including incidental abnormal labs, radiological findings, understands need for follow-up with primary care or specialist if indicated.     Pt is aware that discharge does not mean that nothing is wrong but it indicates no emergency is present and they must continue care with follow-up as given below or physician of their choice    Radiology Studies:  Reviewed by myself, Read by Radiologist.  Chart review: 3/17/2022 Patient was treated by Dr. Mcpherson for tennis elbow.     Comorbidities: see above, reviewed     Differentials: Dislocation, fracture, contusion  not all inclusive of differentials considered    Appropriate PPE worn throughout the care of this patient.           Amount and/or Complexity of Data Reviewed  Tests in the radiology section of CPT®: reviewed    Risk of Complications, Morbidity, and/or Mortality  Presenting problems: high    Patient Progress  Patient progress: stable      Final diagnoses:   Right shoulder pain, unspecified chronicity       ED Disposition  ED Disposition     ED Disposition   Discharge    Condition   Stable    Comment   --             Rashel Manrique MD  [address] [address]  [phone]    Call in 1 week  As  needed    Karson Mcpherson MD  [facility] [place] [address]  [phone]    Call in 1 week  As needed         Medication List      No changes were made to your prescriptions during this visit.          Leah Trevizo, APRN  10/12/22 0902

## 2022-10-12 NOTE — DISCHARGE INSTRUCTIONS
Follow up with orthopedist    Wear sling for comfort, tylenol or ibuprofen as needed for discomfort to the area    Ice to the area 20 minutes at a time several times a day  Return to the ED for new or worsening symptoms     Follow up with PCP as needed

## 2022-10-12 NOTE — PROGRESS NOTES
"Martinez is a 42 y.o. year old male presents to [facility] ORTH[facility]    Chief Complaint   Patient presents with   • Right Shoulder - Initial Evaluation, Pain     PAIN 2   DOI TODAY      History of Present Illness  This is a 42-year-old physically fit male presenting to the clinic following a right shoulder dislocation occurring this morning after tripping and catching himself on an outstretched right arm striking his bed.  At this time he reports an immediate pop, pressure and numbness sensation feeling like \"rolling out\" at the right shoulder joint.  Following the dislocation he reported numbness of his entire arm and significant pain at the shoulder until a subsequent relocation was performed at [facility] emergency department.  Of note, past history of right shoulder dislocation first occurring in 1997 and the second in 2001.  Reports 2 past right shoulder surgeries both being rotator cuff repairs both during 1997 and 2001.  He denies any dislocations nor injuries to the right shoulder since.  He is a physically fit male who strength trains at the gym frequently.  He states, as recent as last week, when performing front dumbbell raises he feels popping, grinding and mild pain.    I have reviewed the patient's medical, family, and social history in detail and updated the computerized patient record.    Objective:   Pulse 79   Ht 198.1 cm (78\")   Wt 119 kg (263 lb 6.4 oz)   BMI 30.44 kg/m²      Physical Exam    Mask worn throughout the encounter  Vital signs reviewed.   General: No acute distress.  Eyes: conjunctiva clear  ENT: external ears atraumatic  CV: no peripheral edema  Resp: normal respiratory effort, no use of accessory muscles  Skin: no rashes or wounds  Psych: mood and affect appropriate; recent and remote memory intact  Neuro: sensation to light touch intact    MSK Exam  Shoulder Musculoskeletal Exam    Inspection    Right      Ecchymosis: none      Atrophy: none      " Deformity: AC joint prominence      Masses: none      Skin tenting: none      Prior incision: arthroscopic portals      Incision: well-healed      Incisional drainage: none    Palpation    Right      Tenderness: present        Anterior shoulder: moderate        Posterior shoulder: none        Clavicle: none        AC joint: mild        Rotator cuff: mild        Trapezius: none        Bicipital groove: moderate        Distal biceps: none        Lateral arm: none        Elbow: none    Range of Motion    Right      Passive forward elevation: 80.       Passive external rotation in abduction: 20.     Neurovascular    Right      Capillary refill: brisk      Musculocutaneous nerve sensory distribution: normal    Scapula    Right      Position: normal      Winging: none    General      Constitutional: appears stated age and well-nourished    Scleral icterus: no    Labored breathing: no    Psychiatric: normal mood and affect and no acute distress    Neurological: alert and oriented x3    Skin: intact    Tenderness aloong the anterior shoulder around bicipital groove  -Much of this exam was limited due to pain from recent dislocation.    Imaging:  XR Shoulder 2+ View Right (10/12/2022 07:33)     FINDINGS: There is no acute fracture or dislocation. Glenohumeral joint  is narrowed. There is degenerative change of the AC joint. Visualized  portions of the ribs are within normal limits. Soft tissues are  unremarkable.     IMPRESSION:   1. Degenerative changes of the shoulder and AC joint.  No acute  fracture.     Assessment:  Diagnoses and all orders for this visit:    Acute pain of right shoulder  -     MRI Shoulder Right Without Contrast; Future        Plan: Recommend MRI for evaluation of internal derangement of the right shoulder. Follow up with Dr. Mcpherson for review of MRI.     Follow Up   Return for MRI.  Patient was given instructions and counseling regarding his condition or for health maintenance advice. Please see  specific information pulled into the AVS if appropriate.     EMR Dragon/Transcription disclaimer:    Much of this encounter note is an electronic transcription/translation of spoken language to printed text.  The electronic translation of spoken language may permit erroneous, or at times, nonsensical words or phrases to be inadvertently transcribed.  Although I have reviewed the note for such errors some may still exist.

## 2022-11-07 ENCOUNTER — APPOINTMENT (OUTPATIENT)
Dept: MRI IMAGING | Facility: HOSPITAL | Age: 42
End: 2022-11-07

## 2022-11-10 ENCOUNTER — HOSPITAL ENCOUNTER (OUTPATIENT)
Dept: MRI IMAGING | Facility: HOSPITAL | Age: 42
Discharge: HOME | End: 2022-11-10
Admitting: PHYSICIAN ASSISTANT

## 2022-11-10 DIAGNOSIS — M25.511 ACUTE PAIN OF RIGHT SHOULDER: ICD-10-CM

## 2022-11-10 PROCEDURE — 73221 MRI JOINT UPR EXTREM W/O DYE: CPT

## 2022-11-21 ENCOUNTER — OFFICE VISIT (OUTPATIENT)
Dept: ORTHOPEDIC SURGERY | Facility: CLINIC | Age: 42
End: 2022-11-21

## 2022-11-21 ENCOUNTER — TELEPHONE (OUTPATIENT)
Dept: ORTHOPEDIC SURGERY | Facility: CLINIC | Age: 42
End: 2022-11-21

## 2022-11-21 VITALS — WEIGHT: 263 LBS | HEIGHT: 78 IN | HEART RATE: 86 BPM | BODY MASS INDEX: 30.43 KG/M2

## 2022-11-21 DIAGNOSIS — M75.111 PARTIAL NONTRAUMATIC TEAR OF RIGHT ROTATOR CUFF: Primary | ICD-10-CM

## 2022-11-21 PROCEDURE — 99213 OFFICE O/P EST LOW 20 MIN: CPT | Performed by: ORTHOPAEDIC SURGERY

## 2022-11-21 RX ORDER — MELOXICAM 7.5 MG/1
7.5 TABLET ORAL DAILY PRN
Qty: 30 TABLET | Refills: 4 | Status: SHIPPED | OUTPATIENT
Start: 2022-11-21 | End: 2023-02-24 | Stop reason: HOSPADM

## 2022-11-21 NOTE — PROGRESS NOTES
"     Patient ID: Martinez Cobb is a 42 y.o. male.    Chief Complaint:    Chief Complaint   Patient presents with   • Right Shoulder - Follow-up, Pain     Pain 6-7       HPI:  This is a 42-year-old gentleman here with recent onset right shoulder pain.  He has a distant history many years ago of shoulder stabilization surgery done arthroscopically he has had atraumatic onset of pain deep in the shoulder some referral to the deltoid problems lifting his arm pain at night  Past Medical History:   Diagnosis Date   • Tennis elbow     left       Past Surgical History:   Procedure Laterality Date   • FOOT SURGERY Bilateral    • LATERAL EPICONDYLE RELEASE Left 1/5/2021    Procedure: TENNIS ELBOW RELEASE LATERAL EPICONDYLAR REPAIR;  Surgeon: Karson Mcpherson MD;  Location: [facility] MAIN OR;  Service: Orthopedics;  Laterality: Left;   • SHOULDER SURGERY Bilateral        Family History   Problem Relation Age of Onset   • Diabetes Father    • Hypertension Father    • Hyperlipidemia Father    • Heart disease Father           Social History     Occupational History   • Not on file   Tobacco Use   • Smoking status: Former     Types: Cigarettes     Quit date: 8/13/2012     Years since quitting: 10.2   • Smokeless tobacco: Current   Vaping Use   • Vaping Use: Never used   Substance and Sexual Activity   • Alcohol use: Yes     Alcohol/week: 3.0 standard drinks     Types: 3 Cans of beer per week   • Drug use: No   • Sexual activity: Defer      Review of Systems   Cardiovascular: Negative for chest pain.   Musculoskeletal: Positive for arthralgias.       Objective:    Pulse 86   Ht 198.1 cm (78\")   Wt 119 kg (263 lb)   BMI 30.39 kg/m²     Physical Examination:  Right shoulder is healed arthroscopic portals no areas of tenderness passive elevation 170 abduction 160 external rotation 60 internal rotation L5 with mild pain but no weakness on Speed Mammoth supraspinatus testing.  Belly press and liftoff are 5/5 and 4/5.  Sensory " and motor exam are intact all distributions. Radial pulse is palpable and capillary refill is less than two seconds to all digits.    Imaging:    Previous x-ray and MRI reviewed demonstrate metallic suture anchors in the glenoid mild joint space narrowing on MRI and x-ray partial bursal sided supraspinatus tear  Assessment:  Right shoulder partial cuff tear    Plan:  I recommend physical therapy I wrote for meloxicam see me in 2 months      Procedures         Disclaimer: Part of this note may be an electronic transcription/translation of spoken language to printed text using the Dragon Dictation System

## 2022-11-21 NOTE — TELEPHONE ENCOUNTER
Caller: AMY SHONEBACHLER   Relationship to Patient: [employer]     Phone Number: [phone]  Reason for Call:[employer] CALLING STATING THAT THEY HAVE SOME QUESTIONS REGARDING THE PATIENTS PAPERWORK FROM HIS APPT ON 11/21/22 WITH WORK STATUS AND FMLA

## 2022-12-05 ENCOUNTER — TREATMENT (OUTPATIENT)
Dept: PHYSICAL THERAPY | Facility: CLINIC | Age: 42
End: 2022-12-05

## 2022-12-05 DIAGNOSIS — M25.511 ACUTE PAIN OF RIGHT SHOULDER: Primary | ICD-10-CM

## 2022-12-05 DIAGNOSIS — M75.111 PARTIAL NONTRAUMATIC TEAR OF RIGHT ROTATOR CUFF: ICD-10-CM

## 2022-12-05 PROCEDURE — 97162 PT EVAL MOD COMPLEX 30 MIN: CPT | Performed by: PHYSICAL THERAPIST

## 2022-12-05 PROCEDURE — 97110 THERAPEUTIC EXERCISES: CPT | Performed by: PHYSICAL THERAPIST

## 2022-12-05 PROCEDURE — 97140 MANUAL THERAPY 1/> REGIONS: CPT | Performed by: PHYSICAL THERAPIST

## 2022-12-05 NOTE — PROGRESS NOTES
Physical Therapy Initial Evaluation and Plan of Care    [address] [place] [address] [address]  [place] [address]    Patient:  Martinez Cobb   :  1980  Diagnosis/ICD-10 Code:  Acute pain of right shoulder [M25.511]  Referring practitioner:  Karson Mcpherson, *  Date of Initial Visit:  2022  Today's Date:  2022  Patient seen for 1 session         Visit Diagnoses:      ICD-10-CM ICD-9-CM   1. Acute pain of right shoulder  M25.511 719.41   2. Partial nontraumatic tear of right rotator cuff  M75.111 726.13       PRECAUTIONS:  hx of:   R shld labral repair,  R shld RCR.    Subjective Questionnaire:  Quick DASH = 59% disability.    Subjective Evaluation    History of Present Illness  Mechanism of injury: PT referral for:  M75.111 (ICD-10-CM) - Partial nontraumatic tear of right rotator cuff    Pt with hx of:   R shld labral repair   R shld RCR    Pt presents today with:  Steady dull pain in anterior R shld.    Denies numbness/tingling or sharp/shooting pains down either UE.  Some tingling noted in R thumb>fingers that has incr since fall.  Denies neck pain/dysfunction; therefore, per time this area will not be assessed today; will assess in a subsequent session PRN.    Symptoms began:  Around 1 month ago after tripping on his shoe in the dark with FOOSH injury.    Functional deficits:  Pain and difficulty with lifting objects.  Unable to do regular weight-lifting program or play sports with his children.    Occupation:  Unable to work as a  right now.    Imaging:  XR SHOULDER 2+ VW RIGHT-  10/12/2022  Degenerative changes of the shoulder and AC joint.  No acute  fracture.    MRI SHOULDER RIGHT WO CONTRAST  11/10/2022  1.Evidence for a small poorly defined full thickness perforation involving the distal attachment of the anterior fibers of the infraspinatus component of the cuff. Slightly increased overlying focal bursal fluid is seen at this dislocation.  2.No  evidence of biceps tendon tear.  3.Findings suggesting prior postoperative change associated with the anterior inferior glenoid and overlying labrum.  4.Mild osteoarthritis of the glenohumeral joint. Moderate hypertrophic age-related changes of the acromioclavicular joint are also noted which contribute to mass effect on the underlying supraspinatus muscle.     Denies changes in bowel/bladder function, dizziness/vision issues, or other systemic problems since onset of symptoms.    Quality of life: good    Pain  Current pain ratin  At worst pain ratin  Relieving factors: change in position, relaxation, rest and ice           Vitals in sitting after subjective hx-taking in LUE:  BP:  129/83 mmHg.  HR:  79 bpm.  O2:  98%.    Objective          Static Posture     Shoulders  Rounded.    Palpation     Additional Palpation Details  Not completed per time.    Neurological Testing     Sensation   Cervical/Thoracic   Left   Intact: light touch    Right   Intact: light touch    Shoulder   Left Shoulder   Intact: light touch    Right Shoulder   Intact: light touch    Reflexes   Left   Bae's reflex: negative    Right   Bae's reflex: negative    Additional Neurological Details  Coordination intact via alternating UE forearm pronation/supination with hands in lap.    Active Range of Motion   Left Shoulder   Flexion: WFL  Abduction: WFL    Right Shoulder   Flexion: 75 degrees with pain  Abduction: 82 degrees with pain    Additional Active Range of Motion Details  Sitting.    Passive Range of Motion     Right Shoulder   Flexion: 75 degrees with pain  Abduction: 88 degrees with pain  External rotation 45°: 24 degrees with pain  Internal rotation 45°: 68 degrees with pain    Additional Passive Range of Motion Details  Supine.    Strength/Myotome Testing     Left Shoulder     Planes of Motion   Flexion: 5   Abduction: 5   External rotation at 0°: 5   Internal rotation at 0°: 5     Right Shoulder     Planes of Motion    Flexion: 3+ (pain)   Abduction: 3+ (pain)   External rotation at 0°: 3+ (pain)   Internal rotation at 0°: 3+ (pain)     Left Elbow   Flexion: 5  Extension: 5    Right Elbow   Flexion: 4-  Extension: 4    Left Wrist/Hand   Wrist extension: 5    Right Wrist/Hand   Wrist extension: 5    Additional Strength Details  Finger flex and finger abd/add MMT:  5/5 constance.  MMT completed in available range in sitting.    Tests     Right Shoulder   Positive empty can, Hawkin's and Neer's.   Negative drop arm.         Assessment & Plan     Assessment    Assessment details: PT referral for:  M75.111 (ICD-10-CM) - Partial nontraumatic tear of right rotator cuff    Pt presents today with the following impairments:  --  Subjective questionnaire score (Quick DASH).  --  RUE strength.  --  R shld mobility.  --  Positive special tests.  --  Posture.  --  Complaints of peripheral symptoms.    The above impairments contribute to the following functional deficits:  Functional deficits:  Pain and difficulty with lifting objects.  Unable to do regular weight-lifting program or play sports with his children.  Occupation:  Unable to work as a  right now.    The pt would benefit from skilled PT sessions to address impairments noted above in order to improve functional mobility and quality of life.    The pt tolerated tests/measures during the initial evaluation without problems.  Skilled PT treatment was also initiated today to include manual and there-ex with distribution and explanation of an HEP handout for all exercises performed today.    No complications noted during today's session.  Prognosis: good  Prognosis details: Potential barriers to therapy that may impede prognosis:  High falls risk with hx of recent falls  Recurrent re-injury with multiple surgeries to treatment area      Goals  Plan Goals: STGs to be met in 4 weeks:  --  Require </= 3 cues with HEP performance.  --  Pain levels at worst </= 4/10.  --  R shld flex/abd  PROM in supine to >/= 110 degrees without pain.  --  R shld ER (in scaption) PROM in supine to >/= 35 degrees without pain.  --  Quick DASH score </= 40% disability.  --  Global R shld MMT (flex, abd, IR, ER) in sitting to >/= 4/5 without pain.  --  Global R elbow MMT (flex, ext) in sitting to >/= 4/5 without pain.  --  Report >/= 50% reduction in intensity/frequency of peripheral symptom tingling in R fingers.    LTGs to be met by end of POC:  --  Require no cues with HEP performance for d/c planning.  --  Pain levels at worst </= 2/10.  --  R shld flex/abd PROM/AROM to >/= 160 degrees without pain.  --  R shld ER (in scaption) PROM in supine to >/= 50 degrees without pain.  --  Global R shld MMT (flex, abd, IR, ER) in sitting to 5/5 without pain.  --  Global R elbow MMT (flex, ext) in sitting to 5/5 without pain.  --  Quick DASH score </= 20% disability.  --  Pt will resume reaching/lifting overhead with min-to-no pain.  --  Pt will resume PLOF weight-lifting status and playing sports with his children with min-to-no pain.  --  Pt will be able to resume full-duty work as a  per MD clearance with min-no-pain.  --  Report >/= 75% reduction in intensity/frequency of peripheral symptom tingling in R fingers.    Plan  Therapy options: will be seen for skilled therapy services  Planned modality interventions: cryotherapy, dry needling, electrical stimulation/Russian stimulation, TENS, thermotherapy (hydrocollator packs), traction and ultrasound  Planned therapy interventions: abdominal trunk stabilization, ADL retraining, balance/weight-bearing training, flexibility, functional ROM exercises, home exercise program, joint mobilization, manual therapy, neuromuscular re-education, soft tissue mobilization, spinal/joint mobilization, strengthening, stretching and therapeutic activities  Frequency: 2x week  Duration in weeks: 12  Treatment plan discussed with: patient  Plan details: Palpation assessment  PRN.  Further assess cause of R finger tingling as able.        History # of Personal Factors and/or Comorbidities: MODERATE (1-2)  Examination of Body System(s): # of elements: HIGH (4+)  Clinical Presentation: EVOLVING  Clinical Decision Making: MODERATE      Timed:  Therapeutic Exercise:    10     mins  25218;     Manual Therapy:    10     mins  64529;       Un-Timed:  Mod Eval     20     Mins  05357;      Timed Treatment:   20   mins   Total Treatment:     40   mins      PT SIGNATURE:  Tristan Stratton, PT   [place] PT License #:  [id number]  DATE TREATMENT INITIATED:  12/5/2022    Initial Certification  Certification Period: 12/5/2022 thru 3/4/2023  I certify that the therapy services are furnished while this patient is under my care.  The services outlined above are required by this patient, and will be reviewed every 90 days.      Physician Signature: ________________________________________    PHYSICIAN: Karson Mcpherson MD        DATE: ____________________________________________________    Please sign and return via fax to [facility] - Fax #: [phone]  Thank you, [facility].

## 2022-12-19 ENCOUNTER — TREATMENT (OUTPATIENT)
Dept: PHYSICAL THERAPY | Facility: CLINIC | Age: 42
End: 2022-12-19

## 2022-12-19 DIAGNOSIS — M25.511 ACUTE PAIN OF RIGHT SHOULDER: Primary | ICD-10-CM

## 2022-12-19 DIAGNOSIS — M75.111 PARTIAL NONTRAUMATIC TEAR OF RIGHT ROTATOR CUFF: ICD-10-CM

## 2022-12-19 PROCEDURE — 97140 MANUAL THERAPY 1/> REGIONS: CPT | Performed by: PHYSICAL THERAPIST

## 2022-12-19 PROCEDURE — 97112 NEUROMUSCULAR REEDUCATION: CPT | Performed by: PHYSICAL THERAPIST

## 2022-12-19 PROCEDURE — 97110 THERAPEUTIC EXERCISES: CPT | Performed by: PHYSICAL THERAPIST

## 2022-12-19 NOTE — PROGRESS NOTES
Physical Therapy Daily Treatment Note     [facility]   [address]  [place] [address]    Patient:  Martinez Cobb  :  1980  Referring practitioner:  Karson Mcpherson, *  Date of Initial Visit:  Type: THERAPY  Noted: 2022  Today's Date:  2022      Visit Diagnoses:    ICD-10-CM ICD-9-CM   1. Acute pain of right shoulder  M25.511 719.41   2. Partial nontraumatic tear of right rotator cuff  M75.111 726.13       VISIT#:  2 in POC.    Subjective   Martinez Cobb reports he felt okay after his initial eval last visit.  He has been completing HEP 2x/day as directed to keep R shld loosened without problems.  No sig change in status of pain/function since his last appt (initial eval).  Tinging in R fingers has improved since eval.  No R shld pain at start of session today.      Objective   See exercise, manual, and modality logs for complete treatment.   Updated HEP handout provided today.      ASSESSMENT  Response to manual in treatment log.  Sig improvement in ROM throughout course of PROM today.  Pt tolerated new exercises / activities without problems.  Cues as noted.  Pt felt looser with exercises after manual intervention today.  No complications today.      PLAN  Continue current POC and progress as tolerated per PT evaluation.    Pt declined use of modalities today.      Timed:  Therapeutic Exercise:    16     mins  12691;     Neuromuscular Shagufta:    8    mins  69417;    Manual Therapy:    24     mins  33964;       Timed Treatment:   48   mins   Total Treatment:     48   mins      Tristan Stratton, PT  [place] License # [id number]  Physical Therapist

## 2022-12-28 ENCOUNTER — TREATMENT (OUTPATIENT)
Dept: PHYSICAL THERAPY | Facility: CLINIC | Age: 42
End: 2022-12-28

## 2022-12-28 DIAGNOSIS — M75.111 PARTIAL NONTRAUMATIC TEAR OF RIGHT ROTATOR CUFF: ICD-10-CM

## 2022-12-28 DIAGNOSIS — M25.511 ACUTE PAIN OF RIGHT SHOULDER: Primary | ICD-10-CM

## 2022-12-28 PROCEDURE — 97110 THERAPEUTIC EXERCISES: CPT | Performed by: PHYSICAL THERAPIST

## 2022-12-28 PROCEDURE — 97140 MANUAL THERAPY 1/> REGIONS: CPT | Performed by: PHYSICAL THERAPIST

## 2022-12-28 PROCEDURE — 97112 NEUROMUSCULAR REEDUCATION: CPT | Performed by: PHYSICAL THERAPIST

## 2022-12-28 NOTE — PROGRESS NOTES
Physical Therapy Daily Treatment Note      Patient: Martinez Cobb   : 1980  Referring practitioner: Karson Mcpherson, *  Date of Initial Visit: Type: THERAPY  Noted: 2022  Today's Date: 2022    VISIT#: 3          Subjective   Martinez Cobb reports: 3/10 pain level today in the right anterior shoulder.     Objective   See Exercise, Manual, and Modality Logs for complete treatment.       Assessment & Plan     Assessment    Assessment details: Pt has improved ROM, but is most limited in flexion and abduction today.  Education provided regarding subacromial bursitis and pathology of right shoulder.   Manual performed with increased ROM noted.            Progress per Plan of Care and Progress strengthening /stabilization /functional activity           Timed:  Manual Therapy:    8     mins  95682;  Therapeutic Exercise:    15     mins  37522;     Neuromuscular Shagufta:    15    mins  54487;    Therapeutic Activity:          mins  97720;     Gait Training:           mins  23786;     Ultrasound:          mins  39188;    Electrical Stimulation:         mins  67890 ( );    Untimed:  Electrical Stimulation:         mins  88419 ( );  Mechanical Traction:         mins  46032;   Dry needling:       Self pay    Timed Treatment:   38   mins   Total Treatment:     38   mins  Margo Hampton PT, DPT, CLT, CIDN  Physical Therapist

## 2023-01-03 ENCOUNTER — TREATMENT (OUTPATIENT)
Dept: PHYSICAL THERAPY | Facility: CLINIC | Age: 43
End: 2023-01-03
Payer: COMMERCIAL

## 2023-01-03 DIAGNOSIS — M25.511 ACUTE PAIN OF RIGHT SHOULDER: Primary | ICD-10-CM

## 2023-01-03 DIAGNOSIS — M75.111 PARTIAL NONTRAUMATIC TEAR OF RIGHT ROTATOR CUFF: ICD-10-CM

## 2023-01-03 PROCEDURE — 97035 APP MDLTY 1+ULTRASOUND EA 15: CPT | Performed by: PHYSICAL THERAPIST

## 2023-01-03 PROCEDURE — 97110 THERAPEUTIC EXERCISES: CPT | Performed by: PHYSICAL THERAPIST

## 2023-01-03 PROCEDURE — 97140 MANUAL THERAPY 1/> REGIONS: CPT | Performed by: PHYSICAL THERAPIST

## 2023-01-03 NOTE — PROGRESS NOTES
Physical Therapy Daily Treatment Note      Patient: Martinez Cobb   : 1980  Referring practitioner: Karson Mcpherson, *  Date of Initial Visit: Type: THERAPY  Noted: 2022  Today's Date: 1/3/2023    VISIT#: 4          Subjective   Martinez Cobb reports: 3/10 pain level in the right shoulder with increased soreness noticed lately.     Objective   See Exercise, Manual, and Modality Logs for complete treatment.       Assessment & Plan     Assessment    Assessment details: Pt seems to have more irritation in the AC joint and subacromial space is inflammed with ultrasound performed with some relief noted.            Progress per Plan of Care and Progress strengthening /stabilization /functional activity           Timed:  Manual Therapy:    8     mins  38850;  Therapeutic Exercise:    20    mins  22485;     Neuromuscular Shagufta:        mins  91471;    Therapeutic Activity:          mins  20037;     Gait Training:           mins  37257;     Ultrasound:     10     mins  96446;    Electrical Stimulation:         mins  82228 ( );    Untimed:  Electrical Stimulation:         mins  30602 ( );  Mechanical Traction:         mins  66421;   Dry needling:       Self pay    Timed Treatment:   38   mins   Total Treatment:     38   mins  Margo Hampton PT, DPT, CLT, CIDN  Physical Therapist

## 2023-01-11 ENCOUNTER — TREATMENT (OUTPATIENT)
Dept: PHYSICAL THERAPY | Facility: CLINIC | Age: 43
End: 2023-01-11
Payer: COMMERCIAL

## 2023-01-11 DIAGNOSIS — M75.111 PARTIAL NONTRAUMATIC TEAR OF RIGHT ROTATOR CUFF: ICD-10-CM

## 2023-01-11 DIAGNOSIS — M25.511 ACUTE PAIN OF RIGHT SHOULDER: Primary | ICD-10-CM

## 2023-01-11 PROCEDURE — 97140 MANUAL THERAPY 1/> REGIONS: CPT | Performed by: PHYSICAL THERAPIST

## 2023-01-11 PROCEDURE — 97112 NEUROMUSCULAR REEDUCATION: CPT | Performed by: PHYSICAL THERAPIST

## 2023-01-11 PROCEDURE — 97110 THERAPEUTIC EXERCISES: CPT | Performed by: PHYSICAL THERAPIST

## 2023-01-11 NOTE — PROGRESS NOTES
Physical Therapy Daily Treatment Note    [facility], [address]  [place]  [address] [phone]      Patient: Martinez Cobb   : 1980  Diagnosis/ICD-10 Code:  Acute pain of right shoulder [M25.511]  Referring practitioner: Karson Mcpherson, *.  Follow up 23  Date of Initial Visit: 2023  Today's Date: 2023  Patient seen for 5 sessions.  POC 2x/wk x 12 weeks, exp 3/4/23  Insurance         VISIT#: 5      Subjective :  Pt. C/o pain right posterior shoulder.  He notes he feels that pain has increased since starting PT.  He will see Dr. Mcpherson 23.       Objective     See Exercise, Manual, and Modality Logs for complete treatment. Progression as noted.       Patient Education:  Pt. Shown pictures of infraspinatus and other RTC musculature. HEP progressed and issued.  See chart.     Exercises  Standing Isometric Shoulder Flexion with Doorway - Arm Bent - 2 x daily - 7 x weekly - 1 sets - 10 reps - 5 sec hold  Standing Isometric Shoulder Abduction with Doorway - Arm Bent - 2 x daily - 7 x weekly - 1 sets - 10 reps - 5 sec hold  Standing Isometric Shoulder Extension with Doorway - Arm Bent - 2 x daily - 7 x weekly - 1 sets - 10 reps - 5 sec hold  Standing Isometric Shoulder External Rotation with Doorway - 2 x daily - 7 x weekly - 1 sets - 10 reps - 5 sec hold  Standing Isometric Shoulder Internal Rotation with Towel Roll at Doorway - 2 x daily - 7 x weekly - 1 sets - 10 reps - 5 sec hold      Assessment/Plan:  Pt. Able to progress without issue.  He has discomfort with PROM external rotation but also has difficulty relaxing shoulder.       Progress per Plan of Care:  Monitor response, continue as appropriate.             Timed:         Manual Therapy:   15      mins  59355;     Therapeutic Exercise:    20     mins  40861;     Neuromuscular Shagufta:   10     mins  20293;    Therapeutic Activity:          mins  41470;     Gait Training:           mins  43529;     Ultrasound:           mins  44298;    Ionto                                   mins   17759  Self Care                            mins   15663  Aquatic                               mins 03721    Un-Timed:  Electrical Stimulation:         mins  69925 ( );  Traction          mins 53749      Timed Treatment:  45    mins   Total Treatment:    45    mins    Yun Manning PTA  Physical Therapist Assistant   [place] license:  #[id number]

## 2023-01-16 ENCOUNTER — PREP FOR SURGERY (OUTPATIENT)
Dept: OTHER | Facility: HOSPITAL | Age: 43
End: 2023-01-16
Payer: COMMERCIAL

## 2023-01-16 ENCOUNTER — OFFICE VISIT (OUTPATIENT)
Dept: ORTHOPEDIC SURGERY | Facility: CLINIC | Age: 43
End: 2023-01-16
Payer: COMMERCIAL

## 2023-01-16 VITALS — HEART RATE: 82 BPM | BODY MASS INDEX: 30.59 KG/M2 | HEIGHT: 78 IN | WEIGHT: 264.4 LBS

## 2023-01-16 DIAGNOSIS — M75.111 PARTIAL NONTRAUMATIC TEAR OF RIGHT ROTATOR CUFF: Primary | ICD-10-CM

## 2023-01-16 PROCEDURE — 99214 OFFICE O/P EST MOD 30 MIN: CPT | Performed by: ORTHOPAEDIC SURGERY

## 2023-01-16 PROCEDURE — 97760 ORTHOTIC MGMT&TRAING 1ST ENC: CPT | Performed by: ORTHOPAEDIC SURGERY

## 2023-01-16 NOTE — PROGRESS NOTES
"     Patient ID: Martinez Cobb is a 42 y.o. male.  Right shoulder pain  Follows up for right shoulder pain secondary to partial cuff tear history of shoulder stabilization treated with therapy to this point  Does not really feel like he is making much improvement  Review of Systems:        Objective:    Pulse 82   Ht 198.1 cm (78\")   Wt 120 kg (264 lb 6.4 oz)   BMI 30.55 kg/m²     Physical Examination:  Right shoulder has moderate pain over the AC joint and bicep groove mild pain on crossarm adduction elevation 160 abduction 130 external rotation 40 with pain and weakness on Speed, Dimas, supraspinatus testing.  Belly press and lift off are 5/5 and 4/5.  Sensory and motor exam are intact all distributions. Radial pulse is palpable and capillary refill is less than two seconds to all digits.       Imaging:       Assessment:    Right shoulder high-grade possible small focal full-thickness cuff tear with AC joint arthritis possible bicep tendinitis    Plan:   Further options were discussed he would like to proceed thresher arthroscopy possible cuff repair versus debridement distal clavicle excision possible bicep tenodesis.  Postop sling dispensed  Risks and benefits, specifically risks of bleeding, scar, infection, fracture, stiffness, retear, nerve, tendon or artery damage, the need for further surgery, DVT, and loss of life or limb and rehab were discussed. All questions were answered and addressed.  Greater than 15 minutes was spent demonstrating proper fit and use of the device and signs to monitor for complications      Procedures          Disclaimer: Part of this note may be an electronic transcription/translation of spoken language to printed text using the Dragon Dictation System  "

## 2023-01-17 ENCOUNTER — TELEPHONE (OUTPATIENT)
Dept: ORTHOPEDIC SURGERY | Facility: CLINIC | Age: 43
End: 2023-01-17
Payer: COMMERCIAL

## 2023-01-17 NOTE — TELEPHONE ENCOUNTER
Caller: FRANCISCO    Relationship: [employer]PERVIS[employer] AT [facility]     Best call back number: [phone]    What form or medical record are you requesting: SHORT TERM DISABILITY PAPERWORK WITH BETTER EXPLANATION OR INFO TO SWITCH IT TO LONG TERM DISABILITY     How would you like to receive the form or medical records (pick-up, mail, fax): FAX  If fax, what is the fax number: [phone]

## 2023-01-19 ENCOUNTER — TELEPHONE (OUTPATIENT)
Dept: ORTHOPEDIC SURGERY | Facility: CLINIC | Age: 43
End: 2023-01-19
Payer: COMMERCIAL

## 2023-01-19 NOTE — TELEPHONE ENCOUNTER
I spoke with patient and provided him with [facility] phone number. He will call them for record request

## 2023-01-19 NOTE — TELEPHONE ENCOUNTER
Caller: LAINE ARANDA    Relationship: SELF    Best call back number: [phone]    What form or medical record are you requesting: ANY RECORDS PERTAINING TO RIGHT SHOULDER ISSUES WITH DR. ALVAREZ STARTING IN 10/2022.    Who is requesting this form or medical record from you: PATIENT    How would you like to receive the form or medical records (pick-up, mail, fax): PICKUP      Timeframe paperwork needed: THE NEXT SEVEN BUSINESS DAYS    Additional notes: NA

## 2023-01-25 PROBLEM — M75.111 PARTIAL NONTRAUMATIC TEAR OF RIGHT ROTATOR CUFF: Status: ACTIVE | Noted: 2023-01-25

## 2023-02-15 ENCOUNTER — HOSPITAL ENCOUNTER (OUTPATIENT)
Dept: CARDIOLOGY | Facility: HOSPITAL | Age: 43
Discharge: HOME | End: 2023-02-15
Payer: COMMERCIAL

## 2023-02-15 ENCOUNTER — LAB (OUTPATIENT)
Dept: LAB | Facility: HOSPITAL | Age: 43
End: 2023-02-15
Payer: COMMERCIAL

## 2023-02-15 DIAGNOSIS — M75.111 PARTIAL NONTRAUMATIC TEAR OF RIGHT ROTATOR CUFF: ICD-10-CM

## 2023-02-15 LAB
ANION GAP SERPL CALCULATED.3IONS-SCNC: 6.8 MMOL/L (ref 5–15)
APTT PPP: 28 SECONDS (ref 24–31)
BASOPHILS # BLD AUTO: 0.03 10*3/MM3 (ref 0–0.2)
BASOPHILS NFR BLD AUTO: 0.6 % (ref 0–1.5)
BUN SERPL-MCNC: 13 MG/DL (ref 6–20)
BUN/CREAT SERPL: 12.3 (ref 7–25)
CALCIUM SPEC-SCNC: 9.5 MG/DL (ref 8.6–10.5)
CHLORIDE SERPL-SCNC: 100 MMOL/L (ref 98–107)
CO2 SERPL-SCNC: 29.2 MMOL/L (ref 22–29)
CREAT SERPL-MCNC: 1.06 MG/DL (ref 0.76–1.27)
DEPRECATED RDW RBC AUTO: 42.4 FL (ref 37–54)
EGFRCR SERPLBLD CKD-EPI 2021: 88.7 ML/MIN/1.73
EOSINOPHIL # BLD AUTO: 0.16 10*3/MM3 (ref 0–0.4)
EOSINOPHIL NFR BLD AUTO: 3.3 % (ref 0.3–6.2)
ERYTHROCYTE [DISTWIDTH] IN BLOOD BY AUTOMATED COUNT: 12.5 % (ref 12.3–15.4)
GLUCOSE SERPL-MCNC: 93 MG/DL (ref 65–99)
HCT VFR BLD AUTO: 42.7 % (ref 37.5–51)
HGB BLD-MCNC: 14.9 G/DL (ref 13–17.7)
IMM GRANULOCYTES # BLD AUTO: 0.01 10*3/MM3 (ref 0–0.05)
IMM GRANULOCYTES NFR BLD AUTO: 0.2 % (ref 0–0.5)
INR PPP: 1.05 (ref 0.93–1.1)
LYMPHOCYTES # BLD AUTO: 1.13 10*3/MM3 (ref 0.7–3.1)
LYMPHOCYTES NFR BLD AUTO: 23.2 % (ref 19.6–45.3)
MCH RBC QN AUTO: 32.4 PG (ref 26.6–33)
MCHC RBC AUTO-ENTMCNC: 34.9 G/DL (ref 31.5–35.7)
MCV RBC AUTO: 92.8 FL (ref 79–97)
MONOCYTES # BLD AUTO: 0.67 10*3/MM3 (ref 0.1–0.9)
MONOCYTES NFR BLD AUTO: 13.7 % (ref 5–12)
NEUTROPHILS NFR BLD AUTO: 2.88 10*3/MM3 (ref 1.7–7)
NEUTROPHILS NFR BLD AUTO: 59 % (ref 42.7–76)
NRBC BLD AUTO-RTO: 0 /100 WBC (ref 0–0.2)
PLATELET # BLD AUTO: 181 10*3/MM3 (ref 140–450)
PMV BLD AUTO: 10.7 FL (ref 6–12)
POTASSIUM SERPL-SCNC: 4.1 MMOL/L (ref 3.5–5.2)
PROTHROMBIN TIME: 10.8 SECONDS (ref 9.6–11.7)
RBC # BLD AUTO: 4.6 10*6/MM3 (ref 4.14–5.8)
SODIUM SERPL-SCNC: 136 MMOL/L (ref 136–145)
WBC NRBC COR # BLD: 4.88 10*3/MM3 (ref 3.4–10.8)

## 2023-02-15 PROCEDURE — 93010 ELECTROCARDIOGRAM REPORT: CPT | Performed by: INTERNAL MEDICINE

## 2023-02-15 PROCEDURE — 85730 THROMBOPLASTIN TIME PARTIAL: CPT

## 2023-02-15 PROCEDURE — 85610 PROTHROMBIN TIME: CPT

## 2023-02-15 PROCEDURE — 85025 COMPLETE CBC W/AUTO DIFF WBC: CPT

## 2023-02-15 PROCEDURE — 36415 COLL VENOUS BLD VENIPUNCTURE: CPT

## 2023-02-15 PROCEDURE — 93005 ELECTROCARDIOGRAM TRACING: CPT | Performed by: ORTHOPAEDIC SURGERY

## 2023-02-15 PROCEDURE — 80048 BASIC METABOLIC PNL TOTAL CA: CPT

## 2023-02-17 LAB — QT INTERVAL: 417 MS

## 2023-02-23 RX ORDER — OXYCODONE HYDROCHLORIDE AND ACETAMINOPHEN 5; 325 MG/1; MG/1
1 TABLET ORAL EVERY 6 HOURS PRN
Qty: 28 TABLET | Refills: 0 | Status: SHIPPED | OUTPATIENT
Start: 2023-02-23

## 2023-02-23 RX ORDER — CEPHALEXIN 500 MG/1
500 CAPSULE ORAL 4 TIMES DAILY
Qty: 4 CAPSULE | Refills: 0 | Status: SHIPPED | OUTPATIENT
Start: 2023-02-23 | End: 2023-02-24

## 2023-02-23 RX ORDER — NAPROXEN 500 MG/1
500 TABLET ORAL 2 TIMES DAILY WITH MEALS
Qty: 28 TABLET | Refills: 0 | Status: SHIPPED | OUTPATIENT
Start: 2023-02-23

## 2023-02-23 RX ORDER — PROMETHAZINE HYDROCHLORIDE 12.5 MG/1
12.5 TABLET ORAL EVERY 6 HOURS PRN
Qty: 21 TABLET | Refills: 1 | Status: SHIPPED | OUTPATIENT
Start: 2023-02-23

## 2023-02-24 ENCOUNTER — HOSPITAL ENCOUNTER (OUTPATIENT)
Facility: HOSPITAL | Age: 43
Setting detail: HOSPITAL OUTPATIENT SURGERY
Discharge: HOME | End: 2023-02-24
Attending: ORTHOPAEDIC SURGERY | Admitting: ORTHOPAEDIC SURGERY
Payer: COMMERCIAL

## 2023-02-24 ENCOUNTER — ANESTHESIA EVENT (OUTPATIENT)
Dept: PERIOP | Facility: HOSPITAL | Age: 43
End: 2023-02-24
Payer: COMMERCIAL

## 2023-02-24 ENCOUNTER — ANESTHESIA (OUTPATIENT)
Dept: PERIOP | Facility: HOSPITAL | Age: 43
End: 2023-02-24
Payer: COMMERCIAL

## 2023-02-24 VITALS
TEMPERATURE: 96.8 F | SYSTOLIC BLOOD PRESSURE: 115 MMHG | WEIGHT: 263.6 LBS | DIASTOLIC BLOOD PRESSURE: 75 MMHG | HEIGHT: 78 IN | RESPIRATION RATE: 20 BRPM | HEART RATE: 78 BPM | OXYGEN SATURATION: 96 % | BODY MASS INDEX: 30.5 KG/M2

## 2023-02-24 DIAGNOSIS — M75.111 PARTIAL NONTRAUMATIC TEAR OF RIGHT ROTATOR CUFF: Primary | ICD-10-CM

## 2023-02-24 PROCEDURE — 29824 SHO ARTHRS SRG DSTL CLAVICLC: CPT | Performed by: ORTHOPAEDIC SURGERY

## 2023-02-24 PROCEDURE — 25010000002 CEFAZOLIN PER 500 MG: Performed by: ORTHOPAEDIC SURGERY

## 2023-02-24 PROCEDURE — 25010000002 MIDAZOLAM PER 1 MG: Performed by: ANESTHESIOLOGY

## 2023-02-24 PROCEDURE — 29824 SHO ARTHRS SRG DSTL CLAVICLC: CPT | Performed by: PHYSICIAN ASSISTANT

## 2023-02-24 PROCEDURE — 29826 SHO ARTHRS SRG DECOMPRESSION: CPT | Performed by: PHYSICIAN ASSISTANT

## 2023-02-24 PROCEDURE — 25010000002 PROPOFOL 200 MG/20ML EMULSION: Performed by: ANESTHESIOLOGY

## 2023-02-24 PROCEDURE — 25010000002 DEXAMETHASONE PER 1 MG: Performed by: ANESTHESIOLOGY

## 2023-02-24 PROCEDURE — 25010000002 ROPIVACAINE PER 1 MG: Performed by: ANESTHESIOLOGY

## 2023-02-24 PROCEDURE — 25010000002 KETOROLAC TROMETHAMINE PER 15 MG: Performed by: ORTHOPAEDIC SURGERY

## 2023-02-24 PROCEDURE — 25010000002 ONDANSETRON PER 1 MG: Performed by: ANESTHESIOLOGY

## 2023-02-24 PROCEDURE — 25010000002 EPINEPHRINE PER 0.1 MG: Performed by: ORTHOPAEDIC SURGERY

## 2023-02-24 PROCEDURE — 25010000002 FENTANYL CITRATE (PF) 50 MCG/ML SOLUTION: Performed by: ANESTHESIOLOGY

## 2023-02-24 PROCEDURE — 0 LIDOCAINE 1 % SOLUTION 10 ML VIAL: Performed by: ORTHOPAEDIC SURGERY

## 2023-02-24 PROCEDURE — 29823 SHO ARTHRS SRG XTNSV DBRDMT: CPT | Performed by: ORTHOPAEDIC SURGERY

## 2023-02-24 PROCEDURE — 29826 SHO ARTHRS SRG DECOMPRESSION: CPT | Performed by: ORTHOPAEDIC SURGERY

## 2023-02-24 PROCEDURE — 29823 SHO ARTHRS SRG XTNSV DBRDMT: CPT | Performed by: PHYSICIAN ASSISTANT

## 2023-02-24 RX ORDER — FENTANYL CITRATE 50 UG/ML
INJECTION, SOLUTION INTRAMUSCULAR; INTRAVENOUS
Status: COMPLETED | OUTPATIENT
Start: 2023-02-24 | End: 2023-02-24

## 2023-02-24 RX ORDER — MIDAZOLAM HYDROCHLORIDE 1 MG/ML
INJECTION INTRAMUSCULAR; INTRAVENOUS
Status: COMPLETED | OUTPATIENT
Start: 2023-02-24 | End: 2023-02-24

## 2023-02-24 RX ORDER — SODIUM CHLORIDE, SODIUM LACTATE, POTASSIUM CHLORIDE, CALCIUM CHLORIDE 600; 310; 30; 20 MG/100ML; MG/100ML; MG/100ML; MG/100ML
INJECTION, SOLUTION INTRAVENOUS CONTINUOUS PRN
Status: DISCONTINUED | OUTPATIENT
Start: 2023-02-24 | End: 2023-02-24 | Stop reason: SURG

## 2023-02-24 RX ORDER — LIDOCAINE HYDROCHLORIDE 10 MG/ML
0.5 INJECTION, SOLUTION INFILTRATION; PERINEURAL ONCE AS NEEDED
Status: DISCONTINUED | OUTPATIENT
Start: 2023-02-24 | End: 2023-02-24 | Stop reason: HOSPADM

## 2023-02-24 RX ORDER — ONDANSETRON 2 MG/ML
INJECTION INTRAMUSCULAR; INTRAVENOUS AS NEEDED
Status: DISCONTINUED | OUTPATIENT
Start: 2023-02-24 | End: 2023-02-24 | Stop reason: SURG

## 2023-02-24 RX ORDER — ROCURONIUM BROMIDE 10 MG/ML
INJECTION, SOLUTION INTRAVENOUS AS NEEDED
Status: DISCONTINUED | OUTPATIENT
Start: 2023-02-24 | End: 2023-02-24 | Stop reason: SURG

## 2023-02-24 RX ORDER — ONDANSETRON 2 MG/ML
4 INJECTION INTRAMUSCULAR; INTRAVENOUS ONCE AS NEEDED
Status: DISCONTINUED | OUTPATIENT
Start: 2023-02-24 | End: 2023-02-24 | Stop reason: HOSPADM

## 2023-02-24 RX ORDER — SODIUM CHLORIDE, SODIUM LACTATE, POTASSIUM CHLORIDE, CALCIUM CHLORIDE 600; 310; 30; 20 MG/100ML; MG/100ML; MG/100ML; MG/100ML
1000 INJECTION, SOLUTION INTRAVENOUS CONTINUOUS
Status: DISCONTINUED | OUTPATIENT
Start: 2023-02-24 | End: 2023-02-24 | Stop reason: HOSPADM

## 2023-02-24 RX ORDER — SODIUM CHLORIDE 0.9 % (FLUSH) 0.9 %
10 SYRINGE (ML) INJECTION AS NEEDED
Status: DISCONTINUED | OUTPATIENT
Start: 2023-02-24 | End: 2023-02-24 | Stop reason: HOSPADM

## 2023-02-24 RX ORDER — GLYCOPYRROLATE 0.2 MG/ML
INJECTION INTRAMUSCULAR; INTRAVENOUS AS NEEDED
Status: DISCONTINUED | OUTPATIENT
Start: 2023-02-24 | End: 2023-02-24 | Stop reason: SURG

## 2023-02-24 RX ORDER — ROPIVACAINE HYDROCHLORIDE 5 MG/ML
INJECTION, SOLUTION EPIDURAL; INFILTRATION; PERINEURAL
Status: COMPLETED | OUTPATIENT
Start: 2023-02-24 | End: 2023-02-24

## 2023-02-24 RX ORDER — PROPOFOL 10 MG/ML
INJECTION, EMULSION INTRAVENOUS AS NEEDED
Status: DISCONTINUED | OUTPATIENT
Start: 2023-02-24 | End: 2023-02-24 | Stop reason: SURG

## 2023-02-24 RX ORDER — DEXAMETHASONE SODIUM PHOSPHATE 4 MG/ML
INJECTION, SOLUTION INTRA-ARTICULAR; INTRALESIONAL; INTRAMUSCULAR; INTRAVENOUS; SOFT TISSUE
Status: COMPLETED | OUTPATIENT
Start: 2023-02-24 | End: 2023-02-24

## 2023-02-24 RX ADMIN — ROCURONIUM BROMIDE 20 MG: 50 INJECTION, SOLUTION INTRAVENOUS at 18:22

## 2023-02-24 RX ADMIN — MIDAZOLAM 2 MG: 1 INJECTION INTRAMUSCULAR; INTRAVENOUS at 14:55

## 2023-02-24 RX ADMIN — DEXAMETHASONE SODIUM PHOSPHATE 4 MG: 4 INJECTION, SOLUTION INTRAMUSCULAR; INTRAVENOUS at 18:33

## 2023-02-24 RX ADMIN — CEFAZOLIN 2 G: 2 INJECTION, POWDER, FOR SOLUTION INTRAMUSCULAR; INTRAVENOUS at 18:00

## 2023-02-24 RX ADMIN — GLYCOPYRROLATE 0.2 MG: 0.2 INJECTION INTRAMUSCULAR; INTRAVENOUS at 17:53

## 2023-02-24 RX ADMIN — FENTANYL CITRATE 100 MCG: 50 INJECTION, SOLUTION INTRAMUSCULAR; INTRAVENOUS at 14:55

## 2023-02-24 RX ADMIN — PROPOFOL 250 MG: 10 INJECTION, EMULSION INTRAVENOUS at 17:53

## 2023-02-24 RX ADMIN — GLYCOPYRROLATE 0.4 MG: 0.2 INJECTION INTRAMUSCULAR; INTRAVENOUS at 18:00

## 2023-02-24 RX ADMIN — DEXAMETHASONE SODIUM PHOSPHATE 4 MG: 4 INJECTION, SOLUTION INTRAMUSCULAR; INTRAVENOUS at 14:55

## 2023-02-24 RX ADMIN — ROPIVACAINE HYDROCHLORIDE 30 ML: 5 INJECTION EPIDURAL; INFILTRATION; PERINEURAL at 14:55

## 2023-02-24 RX ADMIN — ROCURONIUM BROMIDE 50 MG: 50 INJECTION, SOLUTION INTRAVENOUS at 17:53

## 2023-02-24 RX ADMIN — ONDANSETRON 4 MG: 2 INJECTION INTRAMUSCULAR; INTRAVENOUS at 18:32

## 2023-02-24 RX ADMIN — SODIUM CHLORIDE, POTASSIUM CHLORIDE, SODIUM LACTATE AND CALCIUM CHLORIDE 1000 ML: 600; 310; 30; 20 INJECTION, SOLUTION INTRAVENOUS at 13:36

## 2023-02-24 RX ADMIN — SODIUM CHLORIDE, SODIUM LACTATE, POTASSIUM CHLORIDE, AND CALCIUM CHLORIDE: .6; .31; .03; .02 INJECTION, SOLUTION INTRAVENOUS at 17:48

## 2023-02-24 RX ADMIN — FENTANYL CITRATE 100 MCG: 50 INJECTION, SOLUTION INTRAMUSCULAR; INTRAVENOUS at 17:53

## 2023-02-24 RX ADMIN — SUGAMMADEX 200 MG: 100 INJECTION, SOLUTION INTRAVENOUS at 18:35

## 2023-02-24 NOTE — ANESTHESIA PROCEDURE NOTES
Airway  Urgency: elective    Date/Time: 2/24/2023 5:55 PM  End Time:2/24/2023 5:55 PM  Airway not difficult    General Information and Staff    Patient location during procedure: OR  Anesthesiologist: Joel Gallagher MD    Indications and Patient Condition  Indications for airway management: airway protection    Preoxygenated: yes  MILS maintained throughout  Mask difficulty assessment: 1 - vent by mask    Final Airway Details  Final airway type: endotracheal airway      Successful airway: ETT  Cuffed: yes   Successful intubation technique: direct laryngoscopy  Endotracheal tube insertion site: oral  Blade: Lety  Blade size: 4  ETT size (mm): 7.0  Cormack-Lehane Classification: grade I - full view of glottis  Placement verified by: chest auscultation and capnometry   Measured from: gums  ETT/EBT to gums (cm): 22  Number of attempts at approach: 1  Assessment: lips, teeth, and gum same as pre-op and atraumatic intubation    Additional Comments  X1 UDVC. ATRAUMATIC.  TEETH IN PREOP CONDITION.  CUFF TO MINIMUM OCCLUSIVE CUFF PRESSURE. POS ETCO2. BS=BS. GAUZE BITE BLOCK

## 2023-02-24 NOTE — ANESTHESIA PREPROCEDURE EVALUATION
Anesthesia Evaluation     Patient summary reviewed and Nursing notes reviewed   NPO Solid Status: > 8 hours  NPO Liquid Status: > 8 hours           Airway   Mallampati: II  TM distance: >3 FB  Neck ROM: full  No difficulty expected  Dental - normal exam     Pulmonary - negative pulmonary ROS and normal exam    breath sounds clear to auscultation  Cardiovascular - normal exam  Exercise tolerance: unable to assess    ECG reviewed  Rhythm: regular  Rate: normal    (+) hyperlipidemia,       Neuro/Psych  (+) numbness, psychiatric history PTSD,    GI/Hepatic/Renal/Endo - negative ROS     Musculoskeletal     Abdominal  - normal exam   Substance History - negative use     OB/GYN negative ob/gyn ROS         Other   arthritis,                      Anesthesia Plan    ASA 2     general with block     intravenous induction     Anesthetic plan, risks, benefits, and alternatives have been provided, discussed and informed consent has been obtained with: patient.        CODE STATUS:

## 2023-02-24 NOTE — ANESTHESIA PROCEDURE NOTES
Peripheral Block      Patient reassessed immediately prior to procedure    Patient location during procedure: pre-op  Start time: 2/24/2023 2:55 PM  Stop time: 2/24/2023 3:00 PM  Reason for block: procedure for pain, at surgeon's request and post-op pain management  Performed by  Anesthesiologist: Raleigh Kuhn MD  Preanesthetic Checklist  Completed: patient identified, IV checked, site marked, risks and benefits discussed, surgical consent, monitors and equipment checked, pre-op evaluation and timeout performed  Prep:  Pt Position: supine  Sterile barriers:gloves, sterile barriers, mask and cap  Prep: ChloraPrep  Patient monitoring: blood pressure monitoring, continuous pulse oximetry and EKG  Procedure    Sedation: yes  Performed under: PNB  Guidance:ultrasound guided    ULTRASOUND INTERPRETATION.  Using ultrasound guidance a 20 G gauge needle was placed in close proximity to the nerve, at which point, under ultrasound guidance anesthetic was injected in the area of the nerve and spread of the anesthesia was seen on ultrasound in close proximity thereto.  There were no abnormalities seen on ultrasound; a digital image was taken; and the patient tolerated the procedure with no complications. Images:still images obtained, printed/placed on chart    Laterality:right  Block Type:interscalene  Injection Technique:single-shot  Needle Type:echogenic  Needle Gauge:20 G  Resistance on Injection: less than 15 psi  Sedation medications used: midazolam (VERSED) injection - Intravenous   2 mg - 2/24/2023 2:55:00 PM  fentaNYL citrate (PF) (SUBLIMAZE) injection - Intravenous   100 mcg - 2/24/2023 2:55:00 PM  Medications Used: dexamethasone (DECADRON) injection - Injection   4 mg - 2/24/2023 2:55:00 PM  ropivacaine (NAROPIN) 0.5 % injection - Injection   30 mL - 2/24/2023 2:55:00 PM      Post Assessment  Injection Assessment: negative aspiration for heme, no paresthesia on injection and incremental injection  Patient  Tolerance:comfortable throughout block  Complications:no  Additional Notes  25 ml of 0.5% Ropivicaine plus Decadron 4 ml. No problem with block

## 2023-02-27 ENCOUNTER — OFFICE VISIT (OUTPATIENT)
Dept: ORTHOPEDIC SURGERY | Facility: CLINIC | Age: 43
End: 2023-02-27
Payer: COMMERCIAL

## 2023-02-27 VITALS — WEIGHT: 263 LBS | HEIGHT: 78 IN | BODY MASS INDEX: 30.43 KG/M2 | HEART RATE: 83 BPM

## 2023-02-27 DIAGNOSIS — Z47.89 ORTHOPEDIC AFTERCARE: Primary | ICD-10-CM

## 2023-02-27 PROCEDURE — 99024 POSTOP FOLLOW-UP VISIT: CPT | Performed by: ORTHOPAEDIC SURGERY

## 2023-02-27 NOTE — PROGRESS NOTES
"     Patient ID: Martinez Cobb is a 42 y.o. male.    2/24/23 right shoulder arthroscopy with extensive debridement and distal clavicle excision with subacromial decompression  Pain controlled  Objective:    Pulse 83   Ht 198.1 cm (78\")   Wt 119 kg (263 lb)   BMI 30.39 kg/m²     Physical Examination:    Wounds are well approximated without infection.  Sensory and motor exam are intact in all distributions. Radial pulse is palpable and capillary refill is less than two seconds to all digits.    Imaging:      Assessment:  Doing well after shoulder arthroscopy    Plan:  Wounds were cleaned and redressed. Restrictions discussed.  Begin therapy and see me in 4 weeks.  Remove dressings in two weeks.  "

## 2023-02-27 NOTE — PATIENT INSTRUCTIONS
Shoulder Arthroscopy: Post- Operative Visit Objectives    Review the operative findings, procedures and photos.  Make sure medications are effective and not causing problems.  Pain: Oxycodone or hydrocodone is for pain. You may take 1 tablet every 6 hours as necessary.  Some patients don’t require the use of these…in those circumstances just use Tylenol extra-strength 1 or 2 tablets every 4-6 hours.   Naproxen 500 mg. For pain and inflammation.  You should take 1 every twice a day.  Do not use Aleve, Ibuprofen, Motrin or Advil during this time.  If you have had any problems stop taking these medicines and please tell us!  Keflex (cephalexin). This is an antibiotic to be taken as a preventive medicine.  Take 1 pill every 6 hours for 1 day. If you have a penicillin allergy this will be replaced by other options.  Wound Care:  Today we will change your dressings and cover your wounds with a plastic covering called Tegaderm. This will allow you to shower immediately.  Remove the tegaderm and underlying dressings in two weeks then ok to get wet in a shower. No Baths or swimming until 4 weeks after surgery.  Keep a towel on the dressing while applying ice.  Exercises and Physical Therapy Remember the protocol is 3 phases:  Healing (6 wks)  Continue the use of the sling for 6 weeks; depending on procedure utilized this may be shorter. You can do gentle range of motion exercise of the elbow and wrist immediately with the arm at the side.  Formal physical therapy will usually start in two weeks.    Rehabilitative (6 wks) You begin a more aggressive phase of physical therapy at the 6 week héctor. Light strengthening and a continued emphasis on protecting the repair are important at this stage.  Restorative (4 wks)  Back to your sports and job activities gradually   Follow Up appointments Schedule Follow up visits as directed usually in 4 weeks. Physical therapy will be ordered today and you should receive a phone call or can  schedule yourself if appropriate.  Notes  Make sure you have all necessary notes and documentation for school or work.  Issues: Remember our goal is to make this process smooth and easy if there is any thing you need please ask us or call back [phone]

## 2023-03-27 ENCOUNTER — OFFICE VISIT (OUTPATIENT)
Dept: ORTHOPEDIC SURGERY | Facility: CLINIC | Age: 43
End: 2023-03-27
Payer: COMMERCIAL

## 2023-03-27 VITALS — HEART RATE: 82 BPM | WEIGHT: 258.4 LBS | OXYGEN SATURATION: 96 % | HEIGHT: 78 IN | BODY MASS INDEX: 29.9 KG/M2

## 2023-03-27 DIAGNOSIS — Z47.89 ORTHOPEDIC AFTERCARE: Primary | ICD-10-CM

## 2023-03-27 PROCEDURE — 99024 POSTOP FOLLOW-UP VISIT: CPT | Performed by: PHYSICIAN ASSISTANT

## 2023-03-27 NOTE — PROGRESS NOTES
"   Patient ID: Martinez Cobb is a 42 y.o. male presenting for  Follow up on shoulder arthroscopy with rotator cuff repair and distal clavicle excision, subacromial decopression,  extensive debridement on 2/24/2023.  He denies beginning physical therapy prior to today's visit.  He reports some stiffness with range of motion and pain along the superior lateral aspect of the right shoulder.        Objective:  Pulse 82   Ht 198.1 cm (78\")   Wt 117 kg (258 lb 6.4 oz)   SpO2 96%   BMI 29.86 kg/m²     Physical Examination:  Right shoulder:  Intact skin.  Well-healed incisions.   Tenderness to palpation over the AC joint and superior lateral shoulder along superior deltoid  Passive forward flexion 88, abduction 85 with pain  Active forward flexion 80, abduction 75  External rotation 30    Imaging:   None to review    Assessment:    Diagnoses and all orders for this visit:    1. Orthopedic aftercare (Primary)        Plan: Recommend beginning physical therapy to strengthen and focus on range of motion to the right shoulder.  Functional capacity evaluation order sent to [facility].  Follow-up in 3 weeks    Functional capacity order placed to be conducted at [facility] location so that patient may continue short term disability.           Disclaimer: Part of this note may be an electronic transcription/translation of spoken language to printed text using the Dragon Dictation System  "

## 2023-03-29 ENCOUNTER — TELEPHONE (OUTPATIENT)
Dept: ORTHOPEDIC SURGERY | Facility: CLINIC | Age: 43
End: 2023-03-29
Payer: COMMERCIAL

## 2023-03-29 NOTE — TELEPHONE ENCOUNTER
Patient was advised Dr Mcpherson does not recommend he perform a FCE at this time due to having a lifting restriction and had surgery 2/24/2023.      A letter was emailed to the patient.

## 2023-03-31 ENCOUNTER — TREATMENT (OUTPATIENT)
Dept: PHYSICAL THERAPY | Facility: CLINIC | Age: 43
End: 2023-03-31
Payer: COMMERCIAL

## 2023-03-31 DIAGNOSIS — M25.511 RIGHT SHOULDER PAIN, UNSPECIFIED CHRONICITY: ICD-10-CM

## 2023-03-31 DIAGNOSIS — Z98.890 S/P RIGHT ROTATOR CUFF REPAIR: ICD-10-CM

## 2023-03-31 DIAGNOSIS — Z47.89 ORTHOPEDIC AFTERCARE: Primary | ICD-10-CM

## 2023-03-31 PROCEDURE — 97110 THERAPEUTIC EXERCISES: CPT | Performed by: PHYSICAL THERAPIST

## 2023-03-31 PROCEDURE — 97140 MANUAL THERAPY 1/> REGIONS: CPT | Performed by: PHYSICAL THERAPIST

## 2023-03-31 PROCEDURE — 97535 SELF CARE MNGMENT TRAINING: CPT | Performed by: PHYSICAL THERAPIST

## 2023-03-31 PROCEDURE — 97161 PT EVAL LOW COMPLEX 20 MIN: CPT | Performed by: PHYSICAL THERAPIST

## 2023-03-31 NOTE — PROGRESS NOTES
Physical Therapy Initial Evaluation and Plan of Care    Patient: Martinez Cobb   : 1980  Diagnosis/ICD-10 Code:  Orthopedic aftercare [Z47.89]  Referring practitioner: Lisandro Yoon PA-C  Date of Initial Visit: 3/31/2023  Today's Date: 3/31/2023  Patient seen for 1 sessions           Subjective Questionnaire: QuickDASH: 73%  Work module: 100%      Subjective Evaluation    History of Present Illness  Date of surgery: 2023  Mechanism of injury: Pt is referred to therapy s/p arthroscopy R RCR , distal clavicle exision and subacromial decompression on 23.     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   He is limited in most activities due to his  post op precautions and restrictions    PMH: unremarkable    Pt reports he was supposed to start therapy after his post op f/u visit but MD's office failed to put in the referral.     Reports his shoulder feels tight and stiff.         Quality of life: good    Pain  Current pain ratin  At best pain ratin  At worst pain ratin    Social Support  Lives with: spouse    Patient Goals  Patient goals for therapy: decreased pain, increased motion, increased strength, return to sport/leisure activities and return to work           Precautions: post op precautions/ restrictions    Objective          Postural Observations  Seated posture: good  Standing posture: good        Palpation     Additional Palpation Details  Diffused TTP ant/ post shoulder/ UTs     Cervical/Thoracic Screen   Cervical range of motion within normal limits    Active Range of Motion     Additional Active Range of Motion Details       Passive Range of Motion     Right Shoulder   Flexion: 110 degrees  with pain  Abduction: 80 degrees with pain  External rotation 45°: 35 degrees with pain    Additional Passive Range of Motion Details  Measured in supine, HOB elevated          Assessment & Plan     Assessment  Impairments: abnormal or restricted ROM, activity intolerance, lacks appropriate home exercise program and pain with function  Functional Limitations: carrying objects, lifting, sleeping, pulling, pushing, uncomfortable because of pain, reaching behind back, reaching overhead and unable to perform repetitive tasks  Assessment details: Pt is a 42 y.o. male referred to therapy s/p arthroscopy R RCR , distal clavicle exision and subacromial decompression on 2/24/23.  Pt presents with impaired ROM/ strength and functional use of R UE due to post op limitations and restrictions.    Upon initial evaluation pt exhibits the above impairments and functional limitations.   Impairments affect performing his normal/ daily activities, and returning to work. He is a  and has been off work for 6 months.     Pt is a good candidate for rehab and will benefit from skilled physical therapy to address impairments, resolve pain and maximize function.    Prognosis: good    Goals  Plan Goals: STGs: in 6 weeks   1- Pt will report at least 25 % improvement and pain reduction   2- Pt will be I with initial HEP and progression of his program per protocol  3- R shoulder PROM will improve by 10 deg   4- Measure AROM    LTGs: by DC  1- Pt will report at least 80 % improvement and pain reduction  2- Pt will be I with final HEP   3- AROM will be WFL to be able to perform light activities without inc pain  4- Strength will be WFL to be able to perform light daily activities without inc pain  5- Pt will report improved quality of sleep   6- Pt will be able to reach overhead with min pain    7- Pt will be able to reach behind back with min pain   8- Pt will voice readiness for DC with independent program   9-  Pt will have improved  DASH score indicating improved function and pain reduction    Plan  Therapy options: will be seen for skilled therapy services  Planned modality interventions: high voltage pulsed current (pain management) and ultrasound  Planned therapy interventions: functional ROM exercises, home exercise program, joint mobilization, manual therapy, neuromuscular re-education, postural training, strengthening, stretching and therapeutic activities  Frequency: 2x week  Duration in weeks: 12  Treatment plan discussed with: patient        See flow sheet for treatment detail    History # of Personal Factors and/or Comorbidities: LOW (0)  Examination of Body System(s): # of elements: MODERATE (3)  Clinical Presentation: STABLE   Clinical Decision Making: LOW           Timed:         Manual Therapy:    8    mins  60556;     Therapeutic Exercise:    15     mins  83184;     Neuromuscular Shagufta:        mins  38799;    Therapeutic Activity:          mins  32323;     Gait Training:           mins  94240;     Ultrasound:          mins  16226;    Ionto                                   mins   98365  Self Care                       10     mins   57754        Un-Timed:  Electrical Stimulation:         mins  84720 ( );  Dry Needling          mins self-pay  Traction          mins 03815  Canal repositioning           mins    32745  Low Eval     20     Mins  47472  Mod Eval          Mins  16746  High Eval                            Mins  02912  Re-Eval                               mins  65849        Timed Treatment:   33   mins   Total Treatment:    53    mins    PT SIGNATURE: Derick Tavarez PT, CLT  [place] License: # [id number]     DATE TREATMENT INITIATED: 3/31/2023    Initial Certification  Certification Period: 3/31/2023 thru 6/28/2023  I certify that the therapy services are furnished while this patient is under my care.  The services outlined above are required by this patient, and will be reviewed every 90 days.     PHYSICIAN:  Lisandro Yoon PA-C   NPI: [id number]                                      DATE:     Please sign and return via fax to [phone].. Thank you, [facility].

## 2023-04-04 ENCOUNTER — TREATMENT (OUTPATIENT)
Dept: PHYSICAL THERAPY | Facility: CLINIC | Age: 43
End: 2023-04-04
Payer: COMMERCIAL

## 2023-04-04 DIAGNOSIS — Z98.890 S/P RIGHT ROTATOR CUFF REPAIR: ICD-10-CM

## 2023-04-04 DIAGNOSIS — Z47.89 ORTHOPEDIC AFTERCARE: Primary | ICD-10-CM

## 2023-04-04 PROCEDURE — 97112 NEUROMUSCULAR REEDUCATION: CPT | Performed by: PHYSICAL THERAPIST

## 2023-04-04 PROCEDURE — 97140 MANUAL THERAPY 1/> REGIONS: CPT | Performed by: PHYSICAL THERAPIST

## 2023-04-04 PROCEDURE — 97110 THERAPEUTIC EXERCISES: CPT | Performed by: PHYSICAL THERAPIST

## 2023-04-04 NOTE — PROGRESS NOTES
Physical Therapy Daily Treatment Note    [facility], [address]  [place] [address]  [phone]    Patient: Martinez Cobb  : 1980  Referring practitioner: Karson Mcpherson, *  Diagnosis/ ICD10 code: Orthopedic aftercare [Z47.89]  Today's Date: 2023    VISIT#: 2     s/p arthroscopy R RCR , distal clavicle exision and subacromial decompression on 23.      5 wks P.O. as of 3/31    Subjective   Pt reports: doing ok this morning, was really sore after last visit. Feels the exercises helping and it feels a little looser. Denies any pain this morning just tightness in R shoulder.       Objective     See Exercise, Manual, and Modality Logs for complete treatment.     Patient Education:    Assessment & Plan     Assessment    Assessment details: Pt tolerated today's rx session well. Improved R shoulder ROM noted today. Demonstrates understanding of his HEP.           Progress per Plan of Care            Timed:         Manual Therapy:   10      mins  72552;     Therapeutic Exercise:    10     mins  98822;     Neuromuscular Shagufta:  10      mins  17066;    Therapeutic Activity:          mins  51234;     Gait Training:           mins  02465;     Ultrasound:          mins  42022;    Ionto                                   mins   58660  Self Care                            mins   67425        Timed Treatment:  30    mins   Total Treatment:     30   mins    Derick Tavarez PT, CLT  Physical Therapist  [place] License:  # [id number]

## 2023-04-06 ENCOUNTER — TREATMENT (OUTPATIENT)
Dept: PHYSICAL THERAPY | Facility: CLINIC | Age: 43
End: 2023-04-06
Payer: COMMERCIAL

## 2023-04-06 DIAGNOSIS — Z47.89 ORTHOPEDIC AFTERCARE: Primary | ICD-10-CM

## 2023-04-06 DIAGNOSIS — Z98.890 S/P RIGHT ROTATOR CUFF REPAIR: ICD-10-CM

## 2023-04-06 PROCEDURE — 97110 THERAPEUTIC EXERCISES: CPT | Performed by: PHYSICAL THERAPIST

## 2023-04-06 PROCEDURE — 97112 NEUROMUSCULAR REEDUCATION: CPT | Performed by: PHYSICAL THERAPIST

## 2023-04-06 PROCEDURE — 97140 MANUAL THERAPY 1/> REGIONS: CPT | Performed by: PHYSICAL THERAPIST

## 2023-04-06 NOTE — PROGRESS NOTES
Physical Therapy Daily Treatment Note    [facility], [address]  [place] [address]  [phone]    Patient: Martinez Cobb  : 1980  Referring practitioner: Karson Mcpherson, *  Diagnosis/ ICD10 code: Orthopedic aftercare [Z47.89]  Today's Date: 2023    VISIT#: 3     s/p arthroscopy R RCR , distal clavicle exision and subacromial decompression on 23.       5 wks P.O. as of 3/31    Subjective   Pt reports: his shoulder has been a little sore , achy and irritating. 2/10 this morning.       Objective     See Exercise, Manual, and Modality Logs for complete treatment.     Patient Education:    Assessment & Plan     Assessment    Assessment details: Pt tolerated today's rx session well. His ROM is improving.           Progress per Plan of Care            Timed:         Manual Therapy:   10      mins  71554;     Therapeutic Exercise:   10      mins  70647;     Neuromuscular Shagufta:  10      mins  95073;    Therapeutic Activity:          mins  44135;     Gait Training:           mins  17012;     Ultrasound:          mins  44268;    Ionto                                   mins   20475  Self Care                            mins   17210            Timed Treatment:  30    mins   Total Treatment:    30    mins    Derick Tavarez PT, CLT  Physical Therapist  [place] License:  # [id number]

## 2023-04-11 ENCOUNTER — TREATMENT (OUTPATIENT)
Dept: PHYSICAL THERAPY | Facility: CLINIC | Age: 43
End: 2023-04-11
Payer: COMMERCIAL

## 2023-04-11 DIAGNOSIS — Z47.89 ORTHOPEDIC AFTERCARE: Primary | ICD-10-CM

## 2023-04-11 DIAGNOSIS — Z98.890 S/P RIGHT ROTATOR CUFF REPAIR: ICD-10-CM

## 2023-04-11 PROCEDURE — 97140 MANUAL THERAPY 1/> REGIONS: CPT | Performed by: PHYSICAL THERAPIST

## 2023-04-11 PROCEDURE — 97112 NEUROMUSCULAR REEDUCATION: CPT | Performed by: PHYSICAL THERAPIST

## 2023-04-11 PROCEDURE — 97110 THERAPEUTIC EXERCISES: CPT | Performed by: PHYSICAL THERAPIST

## 2023-04-11 NOTE — PROGRESS NOTES
Physical Therapy Daily Treatment Note    [facility], [address]  [place] [address]  [phone]    Patient: Martinez Cobb  : 1980  Referring practitioner: Karson Mcpherson, *  Diagnosis/ ICD10 code: Orthopedic aftercare [Z47.89]  Today's Date: 2023    VISIT#: 4     s/p arthroscopy R RCR , distal clavicle exision and subacromial decompression on 23.       6 wks P.O. as of 23    Subjective   Pt reports: his shoulder feels achy and tight. Rates pain 1/10 this morning.       Objective     See Exercise, Manual, and Modality Logs for complete treatment.     Added isometrics to HEP    Patient Education:    Assessment & Plan     Assessment    Assessment details: Pt tolerated today's rx session well.           Progress per Plan of Care / per protocol          Timed:         Manual Therapy:  10       mins  85228;     Therapeutic Exercise:    10     mins  71927;     Neuromuscular Shagufta:  10      mins  41538;    Therapeutic Activity:          mins  23156;     Gait Training:           mins  49533;     Ultrasound:          mins  08071;    Ionto                                   mins   36450  Self Care                            mins   12127            Timed Treatment:   30   mins   Total Treatment:     30   mins    Derick Tavarez PT, CLT  Physical Therapist  [place] License:  # [id number]

## 2023-04-17 ENCOUNTER — OFFICE VISIT (OUTPATIENT)
Dept: ORTHOPEDIC SURGERY | Facility: CLINIC | Age: 43
End: 2023-04-17
Payer: COMMERCIAL

## 2023-04-17 VITALS — OXYGEN SATURATION: 97 % | BODY MASS INDEX: 29.85 KG/M2 | HEIGHT: 78 IN | WEIGHT: 258 LBS

## 2023-04-17 DIAGNOSIS — Z47.89 ORTHOPEDIC AFTERCARE: Primary | ICD-10-CM

## 2023-04-17 PROCEDURE — 99024 POSTOP FOLLOW-UP VISIT: CPT | Performed by: PHYSICIAN ASSISTANT

## 2023-04-17 NOTE — PROGRESS NOTES
"   Patient ID: Martinez Cobb is a 42 y.o. male for follow up on RIGHT shoulder arthroscopy with rotator cuff repair and distal clavicle excision, subacromial decopression, extensive debridement on 2/24/2023.   Reports progressing well with physical therapy with scheduled appointments through the end of April.  Reports that his benefits/[facility] [employer] is wanting a verdict on whether or not permanently disabled, permanent restriction and or at Maximum medical improvement.     Objective:  Ht 198.1 cm (78\")   Wt 117 kg (258 lb)   SpO2 97%   BMI 29.81 kg/m²     Physical Examination:  Right shoulder:    Intact skin. No effusion. No tenderness  Passive fwd flexion 95, abduction 95, ER 25  Tightness with range of motion.    Imaging:   No new exercises.     Assessment:    Diagnoses and all orders for this visit:    1. Orthopedic aftercare (Primary)       Plan: Continue twice daily HEP stretching, May begin light strengthening exercises 2-3lbs, continue efforts to mobilize to restore range of motion. If necessary may provide additional PT sessions at the end of April if improvement falters. Follow up in 6 weeks.         Disclaimer: Part of this note may be an electronic transcription/translation of spoken language to printed text using the Dragon Dictation System  "

## 2023-04-18 ENCOUNTER — TREATMENT (OUTPATIENT)
Dept: PHYSICAL THERAPY | Facility: CLINIC | Age: 43
End: 2023-04-18
Payer: COMMERCIAL

## 2023-04-18 DIAGNOSIS — Z47.89 ORTHOPEDIC AFTERCARE: Primary | ICD-10-CM

## 2023-04-18 DIAGNOSIS — M25.511 RIGHT SHOULDER PAIN, UNSPECIFIED CHRONICITY: ICD-10-CM

## 2023-04-18 DIAGNOSIS — Z98.890 S/P RIGHT ROTATOR CUFF REPAIR: ICD-10-CM

## 2023-04-18 DIAGNOSIS — M25.511 ACUTE PAIN OF RIGHT SHOULDER: ICD-10-CM

## 2023-04-18 PROCEDURE — 97140 MANUAL THERAPY 1/> REGIONS: CPT | Performed by: PHYSICAL THERAPIST

## 2023-04-18 PROCEDURE — 97110 THERAPEUTIC EXERCISES: CPT | Performed by: PHYSICAL THERAPIST

## 2023-04-18 PROCEDURE — 97112 NEUROMUSCULAR REEDUCATION: CPT | Performed by: PHYSICAL THERAPIST

## 2023-04-18 NOTE — PROGRESS NOTES
Physical Therapy Daily Treatment Note    [address] [place] [address], [address]  [place]  [address] [phone]      Patient: Martinez Cobb   : 1980  Diagnosis/ICD-10 Code:  Orthopedic aftercare [Z47.89]  Referring practitioner: Karson Mcpherson, *  F/U 23  Today's Date: 2023  Patient seen for 5 sessions.  POC 2x/wk x 12 weeks, exp 23.    s/p arthroscopy R RCR , distal clavicle exision and subacromial decompression on 23-7 weeks post    DATE 2023 3/3/2023 3/10/2023 3/17/2023 3/24/2023 3/31/2023 2023 2023 2023 2023   POST OP WEEK 0 1 2 3 4 5 6 7 8 9   DATE 2023   POST OP WEEK 10 11 12 13 14 15 16 17 18 19   DATE 2023 2023 2023 2023 2023 2023 2023 2023 2023 9/15/2023   POST OP WEEK 20 21 22 23 24 25 26 27 28 29         VISIT#: 5      Subjective :  Ache in front of right shoulder 1/10.  He notes a pinch with motion close to 90 degrees.       Objective     See Exercise, Manual, and Modality Logs for complete treatment.       Patient Education:  Impingement signs, symptoms and strategies.       Assessment/Plan: Pt. With good relaxation during PROM.  Good tolerance to current interventions.  Some tightness noted in right pecs and right shoulder blade.       Progress per Plan of Care            Timed:         Manual Therapy:    10     mins  13337;     Therapeutic Exercise:   10      mins  16920;     Neuromuscular Shagufta:  10      mins  32595;    Therapeutic Activity:          mins  96190;     Gait Training:           mins  31383;     Ultrasound:          mins  45596;    Ionto                                   mins   03885  Self Care                            mins   80244  Aquatic                               mins 39276    Un-Timed:  Electrical Stimulation:         mins  36247 ( );  Traction          mins 71978      Timed Treatment:   30    mins   Total Treatment:    30    mins          Yun Manning PTA  Physical Therapist Assistant   [place] license:  #[id number]

## 2023-04-20 ENCOUNTER — TREATMENT (OUTPATIENT)
Dept: PHYSICAL THERAPY | Facility: CLINIC | Age: 43
End: 2023-04-20
Payer: COMMERCIAL

## 2023-04-20 DIAGNOSIS — Z47.89 ORTHOPEDIC AFTERCARE: Primary | ICD-10-CM

## 2023-04-20 DIAGNOSIS — Z98.890 S/P RIGHT ROTATOR CUFF REPAIR: ICD-10-CM

## 2023-04-20 PROCEDURE — 97140 MANUAL THERAPY 1/> REGIONS: CPT | Performed by: PHYSICAL THERAPIST

## 2023-04-20 PROCEDURE — 97110 THERAPEUTIC EXERCISES: CPT | Performed by: PHYSICAL THERAPIST

## 2023-04-20 PROCEDURE — 97112 NEUROMUSCULAR REEDUCATION: CPT | Performed by: PHYSICAL THERAPIST

## 2023-04-20 NOTE — PROGRESS NOTES
"Physical Therapy Daily Treatment Note    [facility], [address]  [place] [address]  [phone]    Patient: Martinez Cobb  : 1980  Referring practitioner: Karson Mcpherson, *  Diagnosis/ ICD10 code: Orthopedic aftercare [Z47.89]  Today's Date: 2023    VISIT#: 6  s/p arthroscopy R RCR , distal clavicle exision and subacromial decompression on 23-  8 weeks post/ as of      Subjective   Pt reports: doing ok this morning. Saw Dr Mcpherson's PA on Monday. He said to continue with therapy. Has some pain and discomfort in R shoulder feels like \" something pinching\" .       Objective     See Exercise, Manual, and Modality Logs for complete treatment. Continued / progressed with his exercise program as noted.     Patient Education:    Assessment & Plan     Assessment    Assessment details: Pt tolerated today's rx session well. Demonstrates understanding of his HEP.           Progress per Plan of Care            Timed:         Manual Therapy:  10       mins  95204;     Therapeutic Exercise:   10      mins  95397;     Neuromuscular Shagufta:   10     mins  62310;    Therapeutic Activity:          mins  64605;     Gait Training:           mins  29357;     Ultrasound:          mins  85099;    Ionto                                   mins   11719  Self Care                            mins   03403        Timed Treatment:  30    mins   Total Treatment:    30    mins    Derick Tavarez PT, CLT  Physical Therapist  [place] License:  # [id number]  "

## 2023-04-25 ENCOUNTER — TREATMENT (OUTPATIENT)
Dept: PHYSICAL THERAPY | Facility: CLINIC | Age: 43
End: 2023-04-25
Payer: COMMERCIAL

## 2023-04-25 DIAGNOSIS — M25.511 RIGHT SHOULDER PAIN, UNSPECIFIED CHRONICITY: ICD-10-CM

## 2023-04-25 DIAGNOSIS — Z98.890 S/P RIGHT ROTATOR CUFF REPAIR: ICD-10-CM

## 2023-04-25 DIAGNOSIS — Z47.89 ORTHOPEDIC AFTERCARE: Primary | ICD-10-CM

## 2023-04-25 PROCEDURE — 97112 NEUROMUSCULAR REEDUCATION: CPT | Performed by: PHYSICAL THERAPIST

## 2023-04-25 PROCEDURE — 97140 MANUAL THERAPY 1/> REGIONS: CPT | Performed by: PHYSICAL THERAPIST

## 2023-04-25 PROCEDURE — 97110 THERAPEUTIC EXERCISES: CPT | Performed by: PHYSICAL THERAPIST

## 2023-04-25 NOTE — PROGRESS NOTES
Physical Therapy Daily Treatment Note    [address] [place] [address], [address]  [place]  [address] [phone]      Patient: Martinez Cobb   : 1980  Diagnosis/ICD-10 Code:  Orthopedic aftercare [Z47.89]  Referring practitioner: Karson Mcpherson, *  F/U 23  Today's Date: 2023  Patient seen for 7 sessions.  POC 2x/wk x 12 weeks, exp 23.    s/p arthroscopy R RCR , distal clavicle exision and subacromial decompression on 23-8 weeks post    DATE 2023 3/3/2023 3/10/2023 3/17/2023 3/24/2023 3/31/2023 2023 2023 2023 2023   POST OP WEEK 0 1 2 3 4 5 6 7 8 9   DATE 2023   POST OP WEEK 10 11 12 13 14 15 16 17 18 19   DATE 2023 2023 2023 2023 2023 2023 2023 2023 2023 9/15/2023   POST OP WEEK 20 21 22 23 24 25 26 27 28 29         VISIT#: 7      Subjective :  No pain this morning.  He continues to note pinch in right shoulder.       Objective     See Exercise, Manual, and Modality Logs for complete treatment.  Progression as noted per protocol.       Patient Education:        Assessment/Plan: Pt. Able to progress without issue.  Less impingement symptoms today.       Progress per Plan of Care  Per protocol.             Timed:         Manual Therapy:    10     mins  64379;     Therapeutic Exercise:   10      mins  83211;     Neuromuscular Shagufta:  10      mins  24220;    Therapeutic Activity:          mins  51290;     Gait Training:           mins  00341;     Ultrasound:          mins  55011;    Ionto                                   mins   17427  Self Care                            mins   75683  Aquatic                               mins 84788    Un-Timed:  Electrical Stimulation:         mins  01652 ( );  Traction          mins 72288      Timed Treatment:  30    mins   Total Treatment:    30    mins          KIMMY Baumann  Therapist Assistant   [place] license:  #[id number]

## 2023-04-27 ENCOUNTER — TREATMENT (OUTPATIENT)
Dept: PHYSICAL THERAPY | Facility: CLINIC | Age: 43
End: 2023-04-27
Payer: COMMERCIAL

## 2023-04-27 DIAGNOSIS — Z47.89 ORTHOPEDIC AFTERCARE: Primary | ICD-10-CM

## 2023-04-27 DIAGNOSIS — Z98.890 S/P RIGHT ROTATOR CUFF REPAIR: ICD-10-CM

## 2023-04-27 PROCEDURE — 97140 MANUAL THERAPY 1/> REGIONS: CPT | Performed by: PHYSICAL THERAPIST

## 2023-04-27 PROCEDURE — 97110 THERAPEUTIC EXERCISES: CPT | Performed by: PHYSICAL THERAPIST

## 2023-04-27 PROCEDURE — 97112 NEUROMUSCULAR REEDUCATION: CPT | Performed by: PHYSICAL THERAPIST

## 2023-04-27 NOTE — PROGRESS NOTES
Physical Therapy Daily Treatment Note    [facility] [facility], [address]  [place] [address]  [phone]    Patient: Martinez Cobb  : 1980  Referring practitioner: Karson Mcpherson, *  Diagnosis/ ICD10 code: Orthopedic aftercare [Z47.89]  Today's Date: 2023    VISIT#: 8     s/p arthroscopy R RCR , distal clavicle exision and subacromial decompression on 23-  8 weeks post/ as of     Subjective   Pt reports: doing ok, feels his shoulder is getting stronger. The pain is getting better too.       Objective     See Exercise, Manual, and Modality Logs for complete treatment.     Patient Education:    Assessment & Plan     Assessment    Assessment details: Pt tolerated today's rx session well. He is making steady progress.           Progress per Plan of Care            Timed:         Manual Therapy:  12       mins  77080;     Therapeutic Exercise:   10      mins  77186;     Neuromuscular Shagufta:  8      mins  48248;    Therapeutic Activity:          mins  19788;     Gait Training:           mins  77017;     Ultrasound:          mins  50408;    Ionto                                   mins   25816  Self Care                            mins   90536        Timed Treatment: 30     mins   Total Treatment:    30    mins    Derick Tavarez, PT, CLT  Physical Therapist  [place] License:  # [id number]

## 2023-05-03 ENCOUNTER — TREATMENT (OUTPATIENT)
Dept: PHYSICAL THERAPY | Facility: CLINIC | Age: 43
End: 2023-05-03
Payer: COMMERCIAL

## 2023-05-03 DIAGNOSIS — Z47.89 ORTHOPEDIC AFTERCARE: Primary | ICD-10-CM

## 2023-05-03 DIAGNOSIS — Z98.890 S/P RIGHT ROTATOR CUFF REPAIR: ICD-10-CM

## 2023-05-03 DIAGNOSIS — M25.511 RIGHT SHOULDER PAIN, UNSPECIFIED CHRONICITY: ICD-10-CM

## 2023-05-03 PROCEDURE — 97112 NEUROMUSCULAR REEDUCATION: CPT | Performed by: PHYSICAL THERAPIST

## 2023-05-03 PROCEDURE — 97140 MANUAL THERAPY 1/> REGIONS: CPT | Performed by: PHYSICAL THERAPIST

## 2023-05-03 PROCEDURE — 97110 THERAPEUTIC EXERCISES: CPT | Performed by: PHYSICAL THERAPIST

## 2023-05-03 NOTE — PROGRESS NOTES
Physical Therapy Daily Treatment Note    [facility] [facility], [address]  [place] [address]  [phone]    Patient: Martinez Cobb  : 1980  Referring practitioner: Karson Mcpherson, *  Diagnosis/ ICD10 code: Orthopedic aftercare [Z47.89]/ s/p R RCR/ R shoulder pain ( M25.511)  Today's Date: 5/3/2023    VISIT#: 9     s/p arthroscopy R RCR , distal clavicle exision and subacromial decompression on 23-  9 weeks post op  as of     Subjective   Pt reports: doing pretty well this morning. His shoulder seems to be getting better and a little stronger. Doesn't feel the pinch anymore. It just feels tight.        Objective     See Exercise, Manual, and Modality Logs for complete treatment. Continued/ progress as noted.     Patient Education:    Assessment & Plan     Assessment    Assessment details: Pt tolerated today's rx session well. PROM improving. No impingement noted today during ROM and the exercises. He is making steady progress.           Progress per Plan of Care            Timed:         Manual Therapy:    10     mins  69858;     Therapeutic Exercise:  10       mins  83309;     Neuromuscular Shagufta: 10       mins  41610;    Therapeutic Activity:          mins  26351;     Gait Training:           mins  09224;     Ultrasound:          mins  01151;    Ionto                                   mins   78645  Self Care                            mins   26005      Un-Timed:  Electrical Stimulation:         mins  21272 ( );  Traction          mins 33347  Canal repositioning           mins    56684        Timed Treatment:   30   mins   Total Treatment:    30    mins    Derick Tavarez PT, CLT  Physical Therapist  [place] License:  # [id number]

## 2023-05-05 ENCOUNTER — TREATMENT (OUTPATIENT)
Dept: PHYSICAL THERAPY | Facility: CLINIC | Age: 43
End: 2023-05-05
Payer: COMMERCIAL

## 2023-05-05 DIAGNOSIS — Z98.890 S/P RIGHT ROTATOR CUFF REPAIR: ICD-10-CM

## 2023-05-05 DIAGNOSIS — M25.511 RIGHT SHOULDER PAIN, UNSPECIFIED CHRONICITY: Primary | ICD-10-CM

## 2023-05-05 PROCEDURE — 97112 NEUROMUSCULAR REEDUCATION: CPT | Performed by: PHYSICAL THERAPIST

## 2023-05-05 PROCEDURE — 97110 THERAPEUTIC EXERCISES: CPT | Performed by: PHYSICAL THERAPIST

## 2023-05-05 PROCEDURE — 97140 MANUAL THERAPY 1/> REGIONS: CPT | Performed by: PHYSICAL THERAPIST

## 2023-05-05 NOTE — PROGRESS NOTES
Physical Therapy Daily Treatment Note    [facility] [facility], [address]  [place] [address]  [phone]    Patient: Martinez Cobb  : 1980  Referring practitioner: Karson Mcpherson, *  Diagnosis/ ICD10 code: Right shoulder pain, unspecified chronicity [M25.511]  Today's Date: 2023    VISIT#: 10     s/p arthroscopy R RCR , distal clavicle exision and subacromial decompression on 23-  10 weeks post op  as of 23    Subjective   Pt reports: feels pretty good this morning. Was a little sore after last session.       Objective     See Exercise, Manual, and Modality Logs for complete treatment. Progressed with there ex per protocol as noted.     Patient Education:    Assessment & Plan     Assessment    Assessment details: Good tolerance to today's treatment session and progression of his exercise program. Making steady progress with improved ROM.           Progress per Plan of Care            Timed:         Manual Therapy:   10      mins  46307;     Therapeutic Exercise:    10     mins  47538;     Neuromuscular Shagufta:   10     mins  03971;    Therapeutic Activity:          mins  85299;     Gait Training:           mins  77941;     Ultrasound:          mins  86635;    Ionto                                   mins   46862  Self Care                           mins   44850      Un-Timed:  Electrical Stimulation:         mins  01578 ( );  Traction          mins 34193  Canal repositioning           mins    45442        Timed Treatment:  30    mins   Total Treatment:   30     mins    Derick Tavarez PT, CLT  Physical Therapist  [place] License:  # [id number]

## 2023-05-09 ENCOUNTER — TREATMENT (OUTPATIENT)
Dept: PHYSICAL THERAPY | Facility: CLINIC | Age: 43
End: 2023-05-09
Payer: COMMERCIAL

## 2023-05-09 DIAGNOSIS — M25.511 RIGHT SHOULDER PAIN, UNSPECIFIED CHRONICITY: Primary | ICD-10-CM

## 2023-05-09 DIAGNOSIS — Z98.890 S/P RIGHT ROTATOR CUFF REPAIR: ICD-10-CM

## 2023-05-09 DIAGNOSIS — Z47.89 ORTHOPEDIC AFTERCARE: ICD-10-CM

## 2023-05-09 PROCEDURE — 97110 THERAPEUTIC EXERCISES: CPT | Performed by: PHYSICAL THERAPIST

## 2023-05-09 PROCEDURE — 97112 NEUROMUSCULAR REEDUCATION: CPT | Performed by: PHYSICAL THERAPIST

## 2023-05-09 PROCEDURE — 97140 MANUAL THERAPY 1/> REGIONS: CPT | Performed by: PHYSICAL THERAPIST

## 2023-05-09 NOTE — PROGRESS NOTES
Physical Therapy Daily Treatment Note    [facility] [facility], [address]  [place]  [address] [phone]      Patient: Martinez Cobb   : 1980  Diagnosis/ICD-10 Code:  Right shoulder pain, unspecified chronicity [M25.511]  Referring practitioner: Karson Mcpherson, *  F/U 23  Today's Date: 2023  Patient seen for 11 sessions.  POC 2x/wk x 12 weeks, exp 23.    s/p arthroscopy R RCR , distal clavicle exision and subacromial decompression on 23-10 weeks post    DATE 2023 3/3/2023 3/10/2023 3/17/2023 3/24/2023 3/31/2023 2023 2023 2023 2023   POST OP WEEK 0 1 2 3 4 5 6 7 8 9   DATE 2023   POST OP WEEK 10 11 12 13 14 15 16 17 18 19   DATE 2023 2023 2023 2023 2023 2023 2023 2023 2023 9/15/2023   POST OP WEEK 20 21 22 23 24 25 26 27 28 29         VISIT#: 11      Subjective :  Patient notes minimal pain 2/10, top of right shoulder.       Objective     See Exercise, Manual, and Modality Logs for complete treatment.  Progression as noted per protocol.       Patient Education:  HEP progressed and issued.  See chart.     Exercises  - Prone Single Arm Shoulder Horizontal Abduction with Dumbbell - Palm Down  - 1 x daily - 4 x weekly - 1 sets - 10 reps  - Prone Single Arm Shoulder Y  - 1 x daily - 4 x weekly - 1 sets - 10 reps    Assessment/Plan: Pt. Able to progress without issue.  Less impingement symptoms today.       Progress per Plan of Care  Per protocol.             Timed:         Manual Therapy:    10     mins  19497;     Therapeutic Exercise:   15      mins  86990;     Neuromuscular Shagufta:  10      mins  04136;    Therapeutic Activity:          mins  94317;     Gait Training:           mins  19720;     Ultrasound:          mins  32861;    Ionto                                   mins   33405  Self Care                            mins    26606  Aquatic                               mins 01138    Un-Timed:  Electrical Stimulation:         mins  16927 ( );  Traction          mins 57081      Timed Treatment:  35    mins   Total Treatment:    35    mins          Yun Manning PTA  Physical Therapist Assistant   [place] license:  #[id number]

## 2023-05-12 ENCOUNTER — TELEPHONE (OUTPATIENT)
Dept: PHYSICAL THERAPY | Facility: CLINIC | Age: 43
End: 2023-05-12

## 2023-05-12 ENCOUNTER — TREATMENT (OUTPATIENT)
Dept: PHYSICAL THERAPY | Facility: CLINIC | Age: 43
End: 2023-05-12
Payer: COMMERCIAL

## 2023-05-12 NOTE — TELEPHONE ENCOUNTER
Patient came into facility to let PT know that he feel yesterday, 5/11/23 going up the steps and used his right UE to break fall.  He is having considerable pain in his right shoulder blade along with muscle spasms.  His appointment was cancelled by provider.  PTA recommended that patient contact Dr. Mcpherson's office to be seen to ensure no injury to surgical area.

## 2023-05-15 NOTE — PROGRESS NOTES
Patient entered department to let PT know that he fell and was in too much pain for PT.  PTA recommended he call Dr. Mcpherson's office and be seen.

## 2023-05-16 ENCOUNTER — TREATMENT (OUTPATIENT)
Dept: PHYSICAL THERAPY | Facility: CLINIC | Age: 43
End: 2023-05-16
Payer: COMMERCIAL

## 2023-05-16 DIAGNOSIS — Z98.890 S/P RIGHT ROTATOR CUFF REPAIR: ICD-10-CM

## 2023-05-16 DIAGNOSIS — M25.511 RIGHT SHOULDER PAIN, UNSPECIFIED CHRONICITY: Primary | ICD-10-CM

## 2023-05-16 NOTE — PROGRESS NOTES
Physical Therapy Daily Treatment Note    [facility] [facility], [address]  [place] [address] [phone]    Patient: Martinez Cobb  : 1980  Referring practitioner: Karson Mcpherson, *  Diagnosis/ ICD10 code: Right shoulder pain, unspecified chronicity [M25.511]  Today's Date: 2023    VISIT#: 12    s/p arthroscopy R RCR , distal clavicle exision and subacromial decompression on 23-  11 weeks post op  as of 23    Subjective   Pt reports: doing a lot better, the pain he was experiencing on Friday is almost gone. Went to the office on Friday none of the Drs were in ,they said to come back on Monday if it was not better by then.  Reports it is a lot better, iced it a few times over the weekend and it feels better.       Objective     See Exercise, Manual, and Modality Logs for complete treatment. Continued/ progressed as noted.     Patient Education:    Assessment & Plan     Assessment    Assessment details: Pt tolerated today's rx session well. Making steady progress. Had no TTP R UTs/ scap ms today, no ROM limitations compare to last seen. Doesn't seem to have any injuries to R shoulder from his fall on Friday.           Progress per Plan of Care            Timed:         Manual Therapy:  10       mins  70555;     Therapeutic Exercise:   10      mins  26154;     Neuromuscular Shagufta:  10      mins  82349;    Therapeutic Activity:          mins  23832;     Gait Training:           mins  66941;     Ultrasound:          mins  56044;    Ionto                                   mins   32886  Self Care                            mins   24508      Un-Timed:  Electrical Stimulation:         mins  19151 ( );  Traction          mins 96958  Canal repositioning           mins    06118        Timed Treatment:  30    mins   Total Treatment:    30    mins    Derick Tavarez, PT, CLT  Physical Therapist  [place] License:  # [id number]

## 2023-05-24 ENCOUNTER — TREATMENT (OUTPATIENT)
Dept: PHYSICAL THERAPY | Facility: CLINIC | Age: 43
End: 2023-05-24
Payer: COMMERCIAL

## 2023-05-24 DIAGNOSIS — M25.511 RIGHT SHOULDER PAIN, UNSPECIFIED CHRONICITY: Primary | ICD-10-CM

## 2023-05-24 DIAGNOSIS — Z98.890 S/P RIGHT ROTATOR CUFF REPAIR: ICD-10-CM

## 2023-05-24 DIAGNOSIS — Z47.89 ORTHOPEDIC AFTERCARE: ICD-10-CM

## 2023-05-24 PROCEDURE — 97140 MANUAL THERAPY 1/> REGIONS: CPT | Performed by: PHYSICAL THERAPIST

## 2023-05-24 PROCEDURE — 97112 NEUROMUSCULAR REEDUCATION: CPT | Performed by: PHYSICAL THERAPIST

## 2023-05-24 PROCEDURE — 97110 THERAPEUTIC EXERCISES: CPT | Performed by: PHYSICAL THERAPIST

## 2023-05-24 NOTE — PROGRESS NOTES
Physical Therapy Progress Note/ 30 day pn    [address] [facility], [address]  [place] [address]  [phone]    Patient: Martinez Cobb  : 1980  Referring practitioner: Karson Mcpherson, *  Diagnosis/ ICD10 code: Right shoulder pain, unspecified chronicity [M25.511]  Today's Date: 2023    VISIT#: 13     s/p arthroscopy R RCR , distal clavicle exision and subacromial decompression on 23-  12 weeks post op  as of 23    Subjective   Pt reports: he is doing ok this morning, feels he is making progress and his arm is getting a little stronger. On Monday he was just sitting at the table, when he felt a shooting pain in R shoulder going down the arm , lasted for just a few min and went away. It hasn't happened again since. Overall feels about 50% improved.       Objective          Active Range of Motion     Right Shoulder   Flexion: 120 degrees   Abduction: 108 degrees   External rotation 45°: 54 degrees   Internal rotation 45°: 65 degrees     Additional Active Range of Motion Details  Measured in supine        See Exercise, Manual, and Modality Logs for complete treatment.     Patient Education:    Assessment & Plan     Assessment    Assessment details: Pt is s/p arthroscopy R RCR , distal clavicle exision and subacromial decompression on 23-  he is 12 weeks post op  as of 23  He is making steady progress with improved ROM and functional use of R UE. Tolerating progression of his exercise program per MD's protocol.   Has met all STGs, making progress towards LTGs.   DASH score: 54.5%    Goals  Plan Goals: STGs: in 6 weeks   1- Pt will report at least 25 % improvement and pain reduction - MET  2- Pt will be I with initial HEP and progression of his program per protocol- MET  3- R shoulder PROM will improve by 10 deg - MET  4- Measure AROM- MET    LTGs: by DC  1- Pt will report at least 80 % improvement and pain reduction  2- Pt will be I with final HEP   3- AROM will be WFL to be able  to perform light activities without inc pain  4- Strength will be WFL to be able to perform light daily activities without inc pain  5- Pt will report improved quality of sleep   6- Pt will be able to reach overhead with min pain    7- Pt will be able to reach behind back with min pain   8- Pt will voice readiness for DC with independent program   9-  Pt will have improved DASH score indicating improved function and pain reduction     Plan  Plan details: Continue PT and progress as tolerated and per MD's protocol.  Current POC still indicated.                       Timed:         Manual Therapy:  10       mins  39256;     Therapeutic Exercise:   10      mins  18880;     Neuromuscular Shagufta:  10      mins  42320;    Therapeutic Activity:          mins  12732;     Gait Training:           mins  15654;     Ultrasound:          mins  04872;    Ionto                                   mins   63430  Self Care                            mins   38427      Un-Timed:  Electrical Stimulation:         mins  00687 ( );  Traction          mins 38326  Canal repositioning           mins    08016      Timed Treatment: 30     mins   Total Treatment:    30    mins    Derick Tavarez PT, CLT  Physical Therapist  [place] License:  # [id number]

## 2023-05-25 ENCOUNTER — OFFICE VISIT (OUTPATIENT)
Dept: ORTHOPEDIC SURGERY | Facility: CLINIC | Age: 43
End: 2023-05-25
Payer: COMMERCIAL

## 2023-05-25 VITALS — BODY MASS INDEX: 29.85 KG/M2 | HEIGHT: 78 IN | WEIGHT: 258 LBS | OXYGEN SATURATION: 98 %

## 2023-05-25 DIAGNOSIS — Z47.89 ORTHOPEDIC AFTERCARE: Primary | ICD-10-CM

## 2023-05-25 NOTE — PROGRESS NOTES
"   Patient ID: Martinez Cobb is a 42 y.o. male presents for follow-up on right shoulder arthroscopy with rotator cuff repair and distal clavicle excision, subacromial decompression, extensive debridement on 2/24/2023. Since his last appointment he reports beginning strengthening exercises and is currently 5lb but progressing well. Reports some pinching along the posterior shoulder and catching when coming forward and abducting. Performing HEP daily, ie resistance band, pulley, stretching.       Objective:  Ht 198.1 cm (78\")   Wt 117 kg (258 lb)   SpO2 98%   BMI 29.81 kg/m²     Physical Examination:  Right shoulder:    Intact skin. No effusion no erythema.  Passive forward flexion 145, abduction 115,        Imaging:   None.    Assessment:    Diagnoses and all orders for this visit:    1. Orthopedic aftercare (Primary)      Plan: Continue PT and daily HEP. IF necessary may order additional PT. Follow up in 6-8 weeks.       Disclaimer: Part of this note may be an electronic transcription/translation of spoken language to printed text using the Dragon Dictation System  "

## 2023-05-30 ENCOUNTER — OFFICE VISIT (OUTPATIENT)
Dept: ORTHOPEDIC SURGERY | Facility: CLINIC | Age: 43
End: 2023-05-30

## 2023-05-30 VITALS — HEART RATE: 76 BPM | BODY MASS INDEX: 29.85 KG/M2 | HEIGHT: 78 IN | WEIGHT: 258 LBS

## 2023-05-30 DIAGNOSIS — Z47.89 ORTHOPEDIC AFTERCARE: Primary | ICD-10-CM

## 2023-05-30 RX ORDER — NAPROXEN SODIUM 220 MG
220 TABLET ORAL 2 TIMES DAILY PRN
COMMUNITY

## 2023-05-30 NOTE — PROGRESS NOTES
"   Patient ID: Martinez Cobb is a 42 y.o. male for follow up on RIGHT shoulder arthroscopy with rotator cuff repair and distal clavicle excision, subacromial decompression, extensive debridement on 2023.He continues to descrie pain over the AC joint with active abduction. 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 Reports his short term disability has  as of May 5, 2023.     Objective:  Pulse 76   Ht 198.1 cm (78\")   Wt 117 kg (258 lb)   BMI 29.81 kg/m²     Physical Examination:  Right shoulder:  Intact skiin.  No effusion.   Passive forward flexion 140, abduction 110  Tenderness over the AC joint and anterior-superior aspects.     Imaging:   None    Assessment:    Diagnoses and all orders for this visit:    1. Orthopedic aftercare (Primary)      Plan: Recommend he perform PT and activities without any restrictions now that he is 3 months post-op. Mr Bailey has reached maximum medical improvement with regards to his right shoulder recovery. At his current status of function he is unable to fulfill his duties as a . Continue with HEP and PT with follow up in late July for further evaluation.     Disclaimer: Part of this note may be an electronic transcription/translation of spoken language to printed text using the Dragon Dictation System  "

## 2023-06-06 ENCOUNTER — TREATMENT (OUTPATIENT)
Dept: PHYSICAL THERAPY | Facility: CLINIC | Age: 43
End: 2023-06-06
Payer: COMMERCIAL

## 2023-06-06 DIAGNOSIS — Z98.890 S/P RIGHT ROTATOR CUFF REPAIR: ICD-10-CM

## 2023-06-06 DIAGNOSIS — Z47.89 ORTHOPEDIC AFTERCARE: ICD-10-CM

## 2023-06-06 DIAGNOSIS — M25.511 RIGHT SHOULDER PAIN, UNSPECIFIED CHRONICITY: Primary | ICD-10-CM

## 2023-06-06 PROCEDURE — 97112 NEUROMUSCULAR REEDUCATION: CPT | Performed by: PHYSICAL THERAPIST

## 2023-06-06 PROCEDURE — 97110 THERAPEUTIC EXERCISES: CPT | Performed by: PHYSICAL THERAPIST

## 2023-06-06 PROCEDURE — 97530 THERAPEUTIC ACTIVITIES: CPT | Performed by: PHYSICAL THERAPIST

## 2023-06-06 NOTE — PROGRESS NOTES
Physical Therapy Daily Treatment Note    [facility] [facility], [address]  [place] [address] [phone]    Patient: Martinez Cobb  : 1980  Referring practitioner: Karson Mcpherson, *  Diagnosis/ ICD10 code: Right shoulder pain, unspecified chronicity [M25.511]  Today's Date: 2023    VISIT#: 14    s/p arthroscopy R RCR , distal clavicle exision and subacromial decompression on 23-  14 weeks post op  as of 23      Subjective   Pt reports: saw the PA last week, he said he didn't have anymore restrictions at this point. Wants him to continue with therapy and go back for a f/u visit end of July.  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  Still has pain with abduction. Unable to sleep on R side.       Objective     See Exercise, Manual, and Modality Logs for complete treatment. Progressed with there ex as noted.     Patient Education: discussed his precautions and limitations at this point and not to over due it. Discussed POC and progression of his HEP.     Assessment & Plan     Assessment    Assessment details: Pt tolerated today's rx session and progression of his exercise program without increase pain or any symptoms.         Progress strengthening /stabilization /functional activity            Timed:         Manual Therapy:         mins  90963;     Therapeutic Exercise:   10      mins  90593;     Neuromuscular Shagufta:  10      mins  17835;    Therapeutic Activity:    10      mins  77042;     Gait Training:           mins  70910;     Ultrasound:          mins  58647;    Ionto                                   mins   54231  Self Care                            mins   16856      Un-Timed:  Electrical Stimulation:        mins  96635 ( );  Traction          mins 50212  Canal repositioning           mins    16890        Timed Treatment:  30    mins   Total Treatment:     30   mins    Derick Tavarez, PT, CLT  Physical Therapist  [place]  License:  # [id number]

## 2023-07-24 ENCOUNTER — OFFICE VISIT (OUTPATIENT)
Dept: ORTHOPEDIC SURGERY | Facility: CLINIC | Age: 43
End: 2023-07-24
Payer: COMMERCIAL

## 2023-07-24 VITALS — HEIGHT: 78 IN | OXYGEN SATURATION: 99 % | BODY MASS INDEX: 30.78 KG/M2 | WEIGHT: 266 LBS

## 2023-07-24 DIAGNOSIS — Z47.89 ORTHOPEDIC AFTERCARE: Primary | ICD-10-CM

## 2023-07-24 PROCEDURE — 99212 OFFICE O/P EST SF 10 MIN: CPT | Performed by: PHYSICIAN ASSISTANT

## 2023-07-24 NOTE — PROGRESS NOTES
"   Patient ID: Martinez Cobb is a 42 y.o. male presents for follow up on RIGHT shoulder arthroscopy with rotator cuff repair and distal clavicle excision, subacromial decompression, extensive debridement on 2/24/2023. Reports the pain in the right shoulder has decreased, most notably over the AC joint when abducting. Otherwise he is very pleased with the outcomes of the surgery and his rehabilitation.     Objective:  Ht 198.1 cm (78\")   Wt 121 kg (266 lb)   SpO2 99%   BMI 30.74 kg/m²     Physical Examination:     Right shoulder:  Intact skin. No effusion.  Active  fwd flexion 140, abduction 115, ER 45, IR L2  Passive fwd flexion 150, Abduction 130  Negative neer  No pain or weakness with o'ld and supraspinatus testing  Belly press 5/5;  lift off 5/5 no pain    Imaging:   None new    Assessment:  Doing well after right shoulder distal clavicle excision and debridement  Diagnoses and all orders for this visit:    1. Orthopedic aftercare (Primary)       Plan: Continue performing HEP stretching and PT to restore full range of motion to the right shoulder. Follow up as needed.     Disclaimer: Part of this note may be an electronic transcription/translation of spoken language to printed text using the Dragon Dictation System  "

## 2023-07-26 ENCOUNTER — TREATMENT (OUTPATIENT)
Dept: PHYSICAL THERAPY | Facility: CLINIC | Age: 43
End: 2023-07-26
Payer: COMMERCIAL

## 2023-07-26 DIAGNOSIS — M25.511 RIGHT SHOULDER PAIN, UNSPECIFIED CHRONICITY: Primary | ICD-10-CM

## 2023-07-26 DIAGNOSIS — Z98.890 S/P RIGHT ROTATOR CUFF REPAIR: ICD-10-CM

## 2023-07-26 DIAGNOSIS — Z47.89 ORTHOPEDIC AFTERCARE: ICD-10-CM

## 2023-07-26 PROCEDURE — 97112 NEUROMUSCULAR REEDUCATION: CPT | Performed by: PHYSICAL THERAPIST

## 2023-07-26 PROCEDURE — 97110 THERAPEUTIC EXERCISES: CPT | Performed by: PHYSICAL THERAPIST

## 2023-07-26 PROCEDURE — 97530 THERAPEUTIC ACTIVITIES: CPT | Performed by: PHYSICAL THERAPIST

## 2023-07-26 NOTE — PROGRESS NOTES
Physical Therapy Daily Treatment Note/ DC summary    [address] [facility], [address]  [place] [address]  [phone]    Patient: Martinez Cobb  : 1980  Referring practitioner: Karson Mcpherson, *  Diagnosis/ ICD10 code: Right shoulder pain, unspecified chronicity [M25.511]  Today's Date: 2023    VISIT#: 18    Subjective   Pt reports: feels he is ready to be DC to continue with his HEP. Saw the PA on Monday and he DC him.  Feels about 80% improved.       Objective          Active Range of Motion     Right Shoulder   Flexion: 150 degrees   Abduction: 150 degrees   External rotation 45°: 70 degrees   Internal rotation 45°: 70 degrees   Internal rotation BTB: lumbar     Additional Active Range of Motion Details  R shoulder ROM is wfl in standing but still is limited compared to L side    Strength/Myotome Testing     Right Shoulder     Planes of Motion   Flexion: 4+   Abduction: 4+   External rotation at 0°: 4+   Internal rotation at 0°: 4+     Isolated Muscles   Biceps: 5   Triceps: 5       See Exercise, Manual, and Modality Logs for complete treatment.     Patient Education:    Assessment & Plan     Assessment    Assessment details: Pt is s/p arthroscopy R RCR , distal clavicle exision and subacromial decompression on 23-     He is 21 weeks post op      He has responded well to therapy and continues to make progress with improved ROM / strength and functional use of R UE.   He has tolerated progression of his exercise program per MD's protocol.      Has met all STGs and  LTGs     DASH score: 27% ( was 30% at last reassessment )          Goals  Plan Goals: Plan Goals: STGs: in 6 weeks      1- Pt will report at least 25 % improvement and pain reduction - MET  2- Pt will be I with initial HEP and progression of his program per protocol- MET  3- R shoulder PROM will improve by 10 deg - MET  4- Measure AROM- MET     LTGs: by DC     1- Pt will report at least 80 % improvement and pain reduction-  MET  2- Pt will be I with final HEP - MET  3- AROM will be WFL to be able to perform light activities without inc pain- MET  4- Strength will be WFL to be able to perform light daily activities without inc pain- MET  5- Pt will report improved quality of sleep - MET  6- Pt will be able to reach overhead with min pain - MET  7- Pt will be able to reach behind back with min pain - MET  8- Pt will voice readiness for DC with independent program - MET  9-  Pt will have improved DASH score indicating improved function and pain reduction  - MET             Plan  Plan details: Pt will be DC to continue with his HEP and independent program.                   Timed:         Manual Therapy:         mins  63482;     Therapeutic Exercise:    20     mins  21372;     Neuromuscular Shagufta:  10      mins  08003;    Therapeutic Activity:     8     mins  62158;     Gait Training:           mins  38707;     Ultrasound:          mins  53286;    Ionto                                   mins   45125  Self Care                            mins   04314      Un-Timed:  Electrical Stimulation:         mins  41854 ( );  Traction          mins 11526  Canal repositioning           mins    21632        Timed Treatment:   38   mins   Total Treatment:    38    mins    Derick Tavarez, PT, CLT  Physical Therapist  [place] License:  # [id number]

## 2023-08-09 ENCOUNTER — TELEPHONE (OUTPATIENT)
Dept: FAMILY MEDICINE CLINIC | Facility: CLINIC | Age: 43
End: 2023-08-09
Payer: COMMERCIAL

## 2023-08-09 NOTE — TELEPHONE ENCOUNTER
"  Caller: Martinez Cobb \"Leo\"    Relationship: Self    Best call back number:     Martinez Cobb \"Leo\" (Self) [phone] (Mobile)       What was the call regarding: WANTED AN UPDATE ON THE CT ORDER THAT WAS PLACED LAST MONTH     Is it okay if the provider responds through [employer]: CALL PLEASE   "

## 2023-08-10 NOTE — TELEPHONE ENCOUNTER
Called patient back but no answer so left voice mail. Hub can transfer pt to office to [facility]

## 2023-08-29 ENCOUNTER — TELEPHONE (OUTPATIENT)
Dept: FAMILY MEDICINE CLINIC | Facility: CLINIC | Age: 43
End: 2023-08-29
Payer: COMMERCIAL

## 2023-08-30 DIAGNOSIS — M48.07 SPINAL STENOSIS OF LUMBOSACRAL REGION: Primary | ICD-10-CM

## 2023-08-30 RX ORDER — TRAMADOL HYDROCHLORIDE 50 MG/1
50 TABLET ORAL EVERY 6 HOURS PRN
Qty: 30 TABLET | Refills: 0 | Status: SHIPPED | OUTPATIENT
Start: 2023-08-30

## 2023-08-30 NOTE — TELEPHONE ENCOUNTER
Results given to patient. Would like referral to spine please. He is asking if you can give him anything for the pain in the meantime until he sees spine? If so, please send to [facility] line [address].

## 2023-09-05 ENCOUNTER — OFFICE VISIT (OUTPATIENT)
Dept: NEUROSURGERY | Facility: CLINIC | Age: 43
End: 2023-09-05
Payer: COMMERCIAL

## 2023-09-05 VITALS
BODY MASS INDEX: 30.57 KG/M2 | WEIGHT: 264.2 LBS | RESPIRATION RATE: 18 BRPM | HEART RATE: 72 BPM | HEIGHT: 78 IN | DIASTOLIC BLOOD PRESSURE: 84 MMHG | SYSTOLIC BLOOD PRESSURE: 134 MMHG

## 2023-09-05 DIAGNOSIS — M51.36 DDD (DEGENERATIVE DISC DISEASE), LUMBAR: ICD-10-CM

## 2023-09-05 DIAGNOSIS — M51.06 INTERVERTEBRAL LUMBAR DISC DISORDER WITH MYELOPATHY, LUMBAR REGION: ICD-10-CM

## 2023-09-05 DIAGNOSIS — M54.16 LUMBAR RADICULOPATHY: Primary | ICD-10-CM

## 2023-09-05 PROBLEM — M51.369 DDD (DEGENERATIVE DISC DISEASE), LUMBAR: Status: ACTIVE | Noted: 2023-09-05

## 2023-09-05 PROCEDURE — 99214 OFFICE O/P EST MOD 30 MIN: CPT | Performed by: NURSE PRACTITIONER

## 2023-09-05 PROCEDURE — 96372 THER/PROPH/DIAG INJ SC/IM: CPT | Performed by: NURSE PRACTITIONER

## 2023-09-05 RX ORDER — GABAPENTIN 300 MG/1
300 CAPSULE ORAL NIGHTLY
Qty: 30 CAPSULE | Refills: 0 | Status: SHIPPED | OUTPATIENT
Start: 2023-09-05

## 2023-09-05 RX ORDER — DEXAMETHASONE 1.5 MG/1
1.5 TABLET ORAL TAKE AS DIRECTED
Qty: 35 TABLET | Refills: 0 | Status: SHIPPED | OUTPATIENT
Start: 2023-09-05

## 2023-09-05 RX ORDER — KETOROLAC TROMETHAMINE 30 MG/ML
30 INJECTION, SOLUTION INTRAMUSCULAR; INTRAVENOUS EVERY 6 HOURS PRN
Status: SHIPPED | OUTPATIENT
Start: 2023-09-05 | End: 2023-09-10

## 2023-09-05 RX ADMIN — KETOROLAC TROMETHAMINE 30 MG: 30 INJECTION, SOLUTION INTRAMUSCULAR; INTRAVENOUS at 11:40

## 2023-09-05 NOTE — PROGRESS NOTES
"Subjective   History of Present Illness: Martinez Cobb is a 42 y.o. male is being seen for consultation today at the request of ROSARIO Montoya for acute history of right-sided low back/hip pain with radiation posteriorly down his leg into the side of his right foot.  This began with no inciting event.  Pain is severe and any type of movement triggers worsening of the pain.   He was seen in July with his PCP who placed him on a 5-day course of steroids , muscle relaxers and  tramadol with no significant benefit.   Patient does have history of chronic back pain x1-1/2 years but no significant leg symptoms reported.  He denies any bowel/bladder dysfunction or saddle anesthesia.  History of Present Illness    The following portions of the patient's history were reviewed and updated as appropriate: allergies, current medications, past family history, past medical history, past social history, past surgical history, and problem list.    Review of Systems   Constitutional:  Positive for activity change.   HENT: Negative.     Eyes: Negative.    Respiratory: Negative.     Cardiovascular: Negative.    Gastrointestinal: Negative.    Endocrine: Negative.    Genitourinary: Negative.    Musculoskeletal:  Positive for arthralgias, back pain and myalgias.   Skin: Negative.    Allergic/Immunologic: Negative.    Neurological:  Positive for weakness (Right leg and hip) and numbness (Right leg and foot).   Hematological: Negative.    Psychiatric/Behavioral:  Positive for sleep disturbance.    All other systems reviewed and are negative.    Objective     ./84 (BP Location: Left arm, Patient Position: Sitting, Cuff Size: Adult)   Pulse 72   Resp 18   Ht 198.1 cm (78\")   Wt 120 kg (264 lb 3.2 oz)   BMI 30.53 kg/m²    Body mass index is 30.53 kg/m².    Vitals:    09/05/23 1059   PainSc:   9   PainLoc: Back          Physical Exam  Neurologic Exam    4 -/5 right plantar flexion  Strong positive straight leg raise test at 5 " degrees  Inability to toe walk on right    Assessment & Plan   Independent Review of Radiographic Studies:      I personally reviewed the images from the following studies.    CT lumbar spine 8/23/2023    Moderate to large right paracentral disc protrusion likely affecting the right traversing S1 nerve root.    Medical Decision Making:      Martinez Luong a 42 y.o. male presenting to clinic today as a new patient for an 8-week history of acute back and leg pain consistent with right-sided lumbar radiculopathy along the S1 dermatome.  CT imaging was completed demonstrating a fairly good sized disc protrusion slightly off to the right narrowing the right lateral recess and likely compressing the traversing S1 nerve root.  His clinical presentation is current with his imaging.  Patient was very uncomfortable during exam and did demonstrate fairly noticeable weakness along his plantarflexion maneuver.    This has been going on for 8 weeks despite fairly consistent and targeted pharmacologic therapy.   I do not believe he would be able to tolerate physical therapy at this time.  Patient is to continue with targeted pharmacologic therapy and I will administer a Toradol shot in place him on a 10-day course of dexamethasone and nightly gabapentin.  He should continue with his anti-inflammatory medication.  I am also recommended a targeted nerve root block S1 and MRI imaging as well as flexion-extension radiographs.  After imaging, patient is to follow-up with Dr. Nguyen for further recommendations including possible surgical discussion.    Details of the Procedure      After reviewing the risks and benefits, the patient was deemed in satisfactory condition to undergo the procedure.  Patient was placed in prone position on table and injection area was cleaned with alcohol.  Patient was administered an IM injection of 30 mg ketorolac into his left dorsogluteal region.  Patient tolerated it well.         Diagnoses and all  orders for this visit:    1. Lumbar radiculopathy (Primary)  -     ketorolac (TORADOL) injection 30 mg  -     Cancel: MRI Lumbar Spine Without Contrast; Future  -     Cancel: XR Spine Lumbar Complete With Flex & Ext; Future  -     Epidural Block  -     MRI Lumbar Spine Without Contrast; Future  -     XR Spine Lumbar Complete With Flex & Ext; Future    2. Intervertebral lumbar disc disorder with myelopathy, lumbar region    3. DDD (degenerative disc disease), lumbar    Other orders  -     dexAMETHasone (DECADRON) 1.5 MG tablet; Take 1 tablet by mouth Take As Directed. Day 1 take 3 am, 3 pm ,Day 2 take 3 am, 2 pm,Day 3 take 3 am, 2 pm,Day 4 take 2 am, 2 pm  ,Day 5 take 2 am, 2 pm,Day 6 take 2 am, 1 pm,Day 7 take 2 am, 1 pm, Day 8 take 1 am, 1 pm,Day 9 take 1 am, 1 pm,Day 10 take 1 am  Dispense: 35 tablet; Refill: 0  -     gabapentin (NEURONTIN) 300 MG capsule; Take 1 capsule by mouth Every Night.  Dispense: 30 capsule; Refill: 0      Return for Next scheduled follow up.    This patient was examined wearing appropriate personal protective equipment.     Martinez Cobb  reports that he quit smoking about 11 years ago. His smoking use included cigarettes. He started smoking about 11 years ago. He has a 2.50 pack-year smoking history. He has been exposed to tobacco smoke. His smokeless tobacco use includes chew.. I     Body mass index is 30.53 kg/m².    BMI is >= 30 and <35. (Class 1 Obesity). The following options were offered after discussion;: exercise counseling/recommendations and nutrition counseling/recommendations    Patient's blood pressure was reviewed.  Recommendations for  a low-salt diet and exercise to maintain/improve BP in addition to taking any presribed medications.             Tamara Bustamante, ROSARIO  09/05/23  12:01 EDT

## 2023-09-07 ENCOUNTER — PATIENT ROUNDING (BHMG ONLY) (OUTPATIENT)
Dept: NEUROSURGERY | Facility: CLINIC | Age: 43
End: 2023-09-07
Payer: COMMERCIAL

## 2023-09-07 NOTE — PROGRESS NOTES
A My-Chart message has been sent to the patient for PATIENT ROUNDING with [facility]

## 2023-09-11 ENCOUNTER — TELEPHONE (OUTPATIENT)
Dept: NEUROSURGERY | Facility: CLINIC | Age: 43
End: 2023-09-11
Payer: COMMERCIAL

## 2023-09-11 NOTE — TELEPHONE ENCOUNTER
Called the patient to let him know I was waiting on his approval from his insurance and we received that today. He said he called [facility] and got scheduled today after calling us.

## 2023-09-11 NOTE — TELEPHONE ENCOUNTER
PATIENT CALLED IN AND STATES [facility] HAS NOT RECEIVED HIS IMAGING ORDERS.  STATES HE WAS TO HAVE AN MRI AND AN XRAY.  PLEASE CALL PATIENT WITH ANY UPDATES.    THANK YOU!

## 2023-09-21 NOTE — PROGRESS NOTES
Neurosurgical Consultation      Martinez Cobb is a 43 y.o. male is here today for follow-up for back pain and to discuss surgery. In the office today patient reports of back pain that radiates into bilateral legs with numbness and weakness.     Chief Complaint   Patient presents with    Back Pain     Follow up extended         Previous treatment: Dexamethasone     HPI: This is a 43-year-old gentleman who was last evaluated by Tamara Bustamante on September 5, 2023.  I was referred this patient from her.  Based on my discussion with him today he has had approximately 18 months of slowly progressive right leg pain.  This has significantly worsened over the last 4 to 5 months.  It is life altering it inhibits his ability to have a high quality of life.  His pain is almost exclusively in his leg and resembles an S1 pattern.  He was placed on an oral steroid Dosepak in July as well as being provided with muscle relaxers and tramadol.  He did not receive significant benefit from this.  CT imaging was obtained and reviewed showing signs of a possible right-sided L5-S1 disc herniation.  Tamara felt as though he was not appropriate for physical therapy and order him an MRI as well as a flexion-extension x-ray and had him return to see me.  She also provided him with a intramuscular Toradol shot.  He also attempted gabapentin without profound relief.    Past Medical History:   Diagnosis Date    Low back pain 8/22    Lumbosacral disc disease 5/23    Right shoulder pain     Tennis elbow     left        Past Surgical History:   Procedure Laterality Date    FOOT SURGERY Bilateral     LATERAL EPICONDYLE RELEASE Left 01/05/2021    Procedure: TENNIS ELBOW RELEASE LATERAL EPICONDYLAR REPAIR;  Surgeon: Karson Mcpherson MD;  Location: [facility];  Service: Orthopedics;  Laterality: Left;    SHOULDER ARTHROSCOPY W/ ROTATOR CUFF REPAIR Right 02/24/2023    Procedure: SHOULDER ARTHROSCOPY WITH ROTATOR CUFF REPAIR distal clavicle  excision, subacromial decopression,  extensive debridement;  Surgeon: Karson Mcpherson MD;  Location: [facility] MAIN OR;  Service: Orthopedics;  Laterality: Right;    SHOULDER SURGERY Bilateral     left RCR, right RCR, and right labrum repair        Current Outpatient Medications on File Prior to Visit   Medication Sig Dispense Refill    gabapentin (NEURONTIN) 300 MG capsule Take 1 capsule by mouth Every Night. (Patient not taking: Reported on 2023) 30 capsule 0    traMADol (ULTRAM) 50 MG tablet Take 1 tablet by mouth Every 6 (Six) Hours As Needed for Moderate Pain. (Patient not taking: Reported on 2023) 30 tablet 0    [DISCONTINUED] dexAMETHasone (DECADRON) 1.5 MG tablet Take 1 tablet by mouth Take As Directed. Day 1 take 3 am, 3 pm ,Day 2 take 3 am, 2 pm,Day 3 take 3 am, 2 pm,Day 4 take 2 am, 2 pm  ,Day 5 take 2 am, 2 pm,Day 6 take 2 am, 1 pm,Day 7 take 2 am, 1 pm, Day 8 take 1 am, 1 pm,Day 9 take 1 am, 1 pm,Day 10 take 1 am (Patient not taking: Reported on 2023) 35 tablet 0     No current facility-administered medications on file prior to visit.        No Known Allergies     Social History     Socioeconomic History    Marital status:    Tobacco Use    Smoking status: Former     Packs/day: 0.25     Years: 10.00     Pack years: 2.50     Types: Cigarettes     Start date: 2012     Quit date: 2012     Years since quittin.1     Passive exposure: Past    Smokeless tobacco: Current     Types: Chew   Vaping Use    Vaping Use: Never used   Substance and Sexual Activity    Alcohol use: Yes     Alcohol/week: 3.0 standard drinks     Types: 3 Cans of beer per week    Drug use: No    Sexual activity: Defer          Review of Systems   Constitutional:  Positive for activity change.   HENT: Negative.     Eyes: Negative.    Respiratory: Negative.     Cardiovascular: Negative.    Gastrointestinal: Negative.    Endocrine: Negative.    Genitourinary: Negative.    Musculoskeletal:  Positive for  "arthralgias, back pain and myalgias.   Skin: Negative.    Allergic/Immunologic: Negative.    Neurological:  Positive for weakness (Jose Manuel legs) and numbness (Jose Manuel legs).   Hematological: Negative.    Psychiatric/Behavioral:  Positive for sleep disturbance.       Physical Examination:     Vitals:    09/22/23 1341   BP: 133/89   BP Location: Left arm   Patient Position: Sitting   Cuff Size: Adult   Pulse: 98   Resp: 18   Weight: 119 kg (263 lb)   Height: 198.1 cm (78\")   PainSc:   5   PainLoc: Back        Physical Exam        Vitals:    09/22/23 1341   PainSc:   5   PainLoc: Back            Neurological Exam   Neurological examination today appears stable compared to Tamara's evaluation a couple of weeks ago.  He does have some plantarflexion weakness on the right as well as indication of the right sided straight leg raise.      Result Review  The following data was reviewed by: Dimas Nguyen MD on 09/22/2023:    Data reviewed : Radiologic studies MRI of the lumbar spine shows a right-sided eccentric L5-S1 disc herniation clearly contacting the S1 nerve root.  Flexion-extension x-rays of the lumbar spine do not show any dynamic instability.    Assessment/plan:  This is a 43-year-old gentleman without significant health history including absence of being on any blood thinning medications who appears to have a chronic right-sided S1 radiculopathy.  This has not improved and in fact has acutely worsened over the last 4 to 5 months.  He has failed medication management.  I discussed with him today the possibility of a selective nerve root block and subsequent physical therapy versus surgical decompression.  After explanation of the risks and benefits he has elected to proceed with an L5-S1 laminotomy, microdiscectomy on the right side.  We will work to get this scheduled as soon as possible after completion of necessary preoperative evaluation.  I have encouraged him to call with any questions or concerns.      Diagnoses and " all orders for this visit:    1. Lumbar radiculopathy (Primary)  -     Case Request; Standing  -     Follow Anesthesia Guidelines / Protocol; Standing  -     Verify NPO Status; Standing  -     Obtain Informed Consent; Standing  -     SCD (Sequential Compression Device) - Place on Patient in Pre-Op; Standing  -     Verify / Perform Chlorhexidine Skin Prep; Standing  -     MRSA Screen, PCR (Inpatient) - Swab, Nares; Standing  -     Type & Screen; Standing  -     ceFAZolin (ANCEF) 2 g in sodium chloride 0.9 % 100 mL IVPB  -     Case Request         No follow-ups on file.            Dimas Nguyen MD

## 2023-09-22 ENCOUNTER — OFFICE VISIT (OUTPATIENT)
Dept: NEUROSURGERY | Facility: CLINIC | Age: 43
End: 2023-09-22
Payer: COMMERCIAL

## 2023-09-22 VITALS
DIASTOLIC BLOOD PRESSURE: 89 MMHG | WEIGHT: 263 LBS | SYSTOLIC BLOOD PRESSURE: 133 MMHG | RESPIRATION RATE: 18 BRPM | HEIGHT: 78 IN | HEART RATE: 98 BPM | BODY MASS INDEX: 30.43 KG/M2

## 2023-09-22 DIAGNOSIS — M54.16 LUMBAR RADICULOPATHY: Primary | ICD-10-CM

## 2023-10-12 ENCOUNTER — TELEPHONE (OUTPATIENT)
Dept: NEUROSURGERY | Facility: CLINIC | Age: 43
End: 2023-10-12
Payer: COMMERCIAL

## 2023-10-16 NOTE — TELEPHONE ENCOUNTER
Patient called to check the status of his surgery. I advised him that I will check the status and give him a call back.

## 2023-11-06 ENCOUNTER — HOSPITAL ENCOUNTER (OUTPATIENT)
Dept: CARDIOLOGY | Facility: HOSPITAL | Age: 43
Discharge: HOME | End: 2023-11-06
Payer: COMMERCIAL

## 2023-11-06 ENCOUNTER — LAB (OUTPATIENT)
Dept: LAB | Facility: HOSPITAL | Age: 43
End: 2023-11-06
Payer: COMMERCIAL

## 2023-11-06 LAB
ABO GROUP BLD: NORMAL
ANION GAP SERPL CALCULATED.3IONS-SCNC: 8 MMOL/L (ref 5–15)
BLD GP AB SCN SERPL QL: NEGATIVE
BUN SERPL-MCNC: 15 MG/DL (ref 6–20)
BUN/CREAT SERPL: 12.7 (ref 7–25)
CALCIUM SPEC-SCNC: 9.7 MG/DL (ref 8.6–10.5)
CHLORIDE SERPL-SCNC: 101 MMOL/L (ref 98–107)
CO2 SERPL-SCNC: 28 MMOL/L (ref 22–29)
CREAT SERPL-MCNC: 1.18 MG/DL (ref 0.76–1.27)
DEPRECATED RDW RBC AUTO: 41.8 FL (ref 37–54)
EGFRCR SERPLBLD CKD-EPI 2021: 77.5 ML/MIN/1.73
ERYTHROCYTE [DISTWIDTH] IN BLOOD BY AUTOMATED COUNT: 12.3 % (ref 12.3–15.4)
GLUCOSE SERPL-MCNC: 85 MG/DL (ref 65–99)
HCT VFR BLD AUTO: 44.2 % (ref 37.5–51)
HGB BLD-MCNC: 15.1 G/DL (ref 13–17.7)
MCH RBC QN AUTO: 31.7 PG (ref 26.6–33)
MCHC RBC AUTO-ENTMCNC: 34.2 G/DL (ref 31.5–35.7)
MCV RBC AUTO: 92.9 FL (ref 79–97)
MRSA DNA SPEC QL NAA+PROBE: NORMAL
PLATELET # BLD AUTO: 197 10*3/MM3 (ref 140–450)
PMV BLD AUTO: 10.7 FL (ref 6–12)
POTASSIUM SERPL-SCNC: 4.1 MMOL/L (ref 3.5–5.2)
QT INTERVAL: 412 MS
QTC INTERVAL: 414 MS
RBC # BLD AUTO: 4.76 10*6/MM3 (ref 4.14–5.8)
RH BLD: POSITIVE
SODIUM SERPL-SCNC: 137 MMOL/L (ref 136–145)
T&S EXPIRATION DATE: NORMAL
WBC NRBC COR # BLD: 5.05 10*3/MM3 (ref 3.4–10.8)

## 2023-11-06 PROCEDURE — 86900 BLOOD TYPING SEROLOGIC ABO: CPT | Performed by: NEUROLOGICAL SURGERY

## 2023-11-06 PROCEDURE — 85027 COMPLETE CBC AUTOMATED: CPT

## 2023-11-06 PROCEDURE — 87641 MR-STAPH DNA AMP PROBE: CPT | Performed by: NEUROLOGICAL SURGERY

## 2023-11-06 PROCEDURE — 80048 BASIC METABOLIC PNL TOTAL CA: CPT

## 2023-11-06 PROCEDURE — 86850 RBC ANTIBODY SCREEN: CPT | Performed by: NEUROLOGICAL SURGERY

## 2023-11-06 PROCEDURE — 86901 BLOOD TYPING SEROLOGIC RH(D): CPT | Performed by: NEUROLOGICAL SURGERY

## 2023-11-06 PROCEDURE — 93005 ELECTROCARDIOGRAM TRACING: CPT | Performed by: NEUROLOGICAL SURGERY

## 2023-11-14 ENCOUNTER — ANESTHESIA EVENT (OUTPATIENT)
Dept: PERIOP | Facility: HOSPITAL | Age: 43
End: 2023-11-14
Payer: COMMERCIAL

## 2023-11-15 ENCOUNTER — APPOINTMENT (OUTPATIENT)
Dept: GENERAL RADIOLOGY | Facility: HOSPITAL | Age: 43
End: 2023-11-15
Payer: COMMERCIAL

## 2023-11-15 ENCOUNTER — HOSPITAL ENCOUNTER (OUTPATIENT)
Facility: HOSPITAL | Age: 43
Setting detail: HOSPITAL OUTPATIENT SURGERY
Discharge: HOME | End: 2023-11-15
Attending: NEUROLOGICAL SURGERY | Admitting: NEUROLOGICAL SURGERY
Payer: COMMERCIAL

## 2023-11-15 ENCOUNTER — ANESTHESIA (OUTPATIENT)
Dept: PERIOP | Facility: HOSPITAL | Age: 43
End: 2023-11-15
Payer: COMMERCIAL

## 2023-11-15 VITALS
HEART RATE: 76 BPM | SYSTOLIC BLOOD PRESSURE: 149 MMHG | TEMPERATURE: 97.3 F | HEIGHT: 78 IN | BODY MASS INDEX: 30.55 KG/M2 | RESPIRATION RATE: 16 BRPM | DIASTOLIC BLOOD PRESSURE: 87 MMHG | OXYGEN SATURATION: 99 % | WEIGHT: 264 LBS

## 2023-11-15 DIAGNOSIS — M54.16 LUMBAR RADICULOPATHY: ICD-10-CM

## 2023-11-15 DIAGNOSIS — Z98.890 S/P LUMBAR MICRODISCECTOMY: Primary | ICD-10-CM

## 2023-11-15 LAB
QT INTERVAL: 412 MS
QTC INTERVAL: 414 MS

## 2023-11-15 PROCEDURE — 25010000002 DEXAMETHASONE PER 1 MG: Performed by: NURSE ANESTHETIST, CERTIFIED REGISTERED

## 2023-11-15 PROCEDURE — 25010000002 METHYLPREDNISOLONE PER 80 MG: Performed by: NEUROLOGICAL SURGERY

## 2023-11-15 PROCEDURE — 25010000002 PROPOFOL 200 MG/20ML EMULSION: Performed by: NURSE ANESTHETIST, CERTIFIED REGISTERED

## 2023-11-15 PROCEDURE — 72100 X-RAY EXAM L-S SPINE 2/3 VWS: CPT

## 2023-11-15 PROCEDURE — 25010000002 MIDAZOLAM PER 1 MG: Performed by: NURSE ANESTHETIST, CERTIFIED REGISTERED

## 2023-11-15 PROCEDURE — 25010000002 CEFAZOLIN 3 G RECONSTITUTED SOLUTION 1 EACH VIAL: Performed by: NEUROLOGICAL SURGERY

## 2023-11-15 PROCEDURE — 25810000003 LACTATED RINGERS PER 1000 ML: Performed by: ANESTHESIOLOGY

## 2023-11-15 PROCEDURE — 25010000002 SUGAMMADEX 200 MG/2ML SOLUTION: Performed by: NURSE ANESTHETIST, CERTIFIED REGISTERED

## 2023-11-15 PROCEDURE — 76000 FLUOROSCOPY <1 HR PHYS/QHP: CPT

## 2023-11-15 PROCEDURE — 25010000002 ONDANSETRON PER 1 MG: Performed by: NURSE ANESTHETIST, CERTIFIED REGISTERED

## 2023-11-15 PROCEDURE — 25010000002 FENTANYL CITRATE (PF) 100 MCG/2ML SOLUTION: Performed by: NURSE ANESTHETIST, CERTIFIED REGISTERED

## 2023-11-15 PROCEDURE — 25010000002 HYDROMORPHONE 1 MG/ML SOLUTION: Performed by: NURSE ANESTHETIST, CERTIFIED REGISTERED

## 2023-11-15 DEVICE — DEV CONTRL TISS STRATAFIX SPIRAL MNCRYL UD 3/0 PLS 30CM: Type: IMPLANTABLE DEVICE | Site: SPINE LUMBAR | Status: FUNCTIONAL

## 2023-11-15 DEVICE — FLOSEAL WITH RECOTHROM - 10ML.
Type: IMPLANTABLE DEVICE | Site: SPINE LUMBAR | Status: FUNCTIONAL
Brand: FLOSEAL HEMOSTATIC MATRIX

## 2023-11-15 RX ORDER — OXYCODONE HYDROCHLORIDE 5 MG/1
10 TABLET ORAL EVERY 4 HOURS PRN
Status: DISCONTINUED | OUTPATIENT
Start: 2023-11-15 | End: 2023-11-15 | Stop reason: HOSPADM

## 2023-11-15 RX ORDER — METHYLPREDNISOLONE ACETATE 80 MG/ML
INJECTION, SUSPENSION INTRA-ARTICULAR; INTRALESIONAL; INTRAMUSCULAR; SOFT TISSUE AS NEEDED
Status: DISCONTINUED | OUTPATIENT
Start: 2023-11-15 | End: 2023-11-15 | Stop reason: HOSPADM

## 2023-11-15 RX ORDER — SODIUM CHLORIDE, SODIUM LACTATE, POTASSIUM CHLORIDE, CALCIUM CHLORIDE 600; 310; 30; 20 MG/100ML; MG/100ML; MG/100ML; MG/100ML
1000 INJECTION, SOLUTION INTRAVENOUS CONTINUOUS
Status: DISCONTINUED | OUTPATIENT
Start: 2023-11-15 | End: 2023-11-15 | Stop reason: HOSPADM

## 2023-11-15 RX ORDER — DIPHENHYDRAMINE HYDROCHLORIDE 50 MG/ML
12.5 INJECTION INTRAMUSCULAR; INTRAVENOUS
Status: DISCONTINUED | OUTPATIENT
Start: 2023-11-15 | End: 2023-11-15 | Stop reason: HOSPADM

## 2023-11-15 RX ORDER — ONDANSETRON 2 MG/ML
INJECTION INTRAMUSCULAR; INTRAVENOUS AS NEEDED
Status: DISCONTINUED | OUTPATIENT
Start: 2023-11-15 | End: 2023-11-15 | Stop reason: SURG

## 2023-11-15 RX ORDER — ONDANSETRON 2 MG/ML
4 INJECTION INTRAMUSCULAR; INTRAVENOUS ONCE AS NEEDED
Status: DISCONTINUED | OUTPATIENT
Start: 2023-11-15 | End: 2023-11-15 | Stop reason: HOSPADM

## 2023-11-15 RX ORDER — SODIUM CHLORIDE 0.9 % (FLUSH) 0.9 %
10 SYRINGE (ML) INJECTION AS NEEDED
Status: DISCONTINUED | OUTPATIENT
Start: 2023-11-15 | End: 2023-11-15 | Stop reason: HOSPADM

## 2023-11-15 RX ORDER — NALOXONE HYDROCHLORIDE 4 MG/.1ML
SPRAY NASAL
Qty: 2 EACH | Refills: 0 | Status: SHIPPED | OUTPATIENT
Start: 2023-11-15

## 2023-11-15 RX ORDER — DEXAMETHASONE SODIUM PHOSPHATE 4 MG/ML
INJECTION, SOLUTION INTRA-ARTICULAR; INTRALESIONAL; INTRAMUSCULAR; INTRAVENOUS; SOFT TISSUE AS NEEDED
Status: DISCONTINUED | OUTPATIENT
Start: 2023-11-15 | End: 2023-11-15 | Stop reason: SURG

## 2023-11-15 RX ORDER — IPRATROPIUM BROMIDE AND ALBUTEROL SULFATE 2.5; .5 MG/3ML; MG/3ML
3 SOLUTION RESPIRATORY (INHALATION) ONCE AS NEEDED
Status: DISCONTINUED | OUTPATIENT
Start: 2023-11-15 | End: 2023-11-15 | Stop reason: HOSPADM

## 2023-11-15 RX ORDER — ROCURONIUM BROMIDE 10 MG/ML
INJECTION, SOLUTION INTRAVENOUS AS NEEDED
Status: DISCONTINUED | OUTPATIENT
Start: 2023-11-15 | End: 2023-11-15 | Stop reason: SURG

## 2023-11-15 RX ORDER — EPHEDRINE SULFATE 5 MG/ML
5 INJECTION INTRAVENOUS ONCE AS NEEDED
Status: DISCONTINUED | OUTPATIENT
Start: 2023-11-15 | End: 2023-11-15 | Stop reason: HOSPADM

## 2023-11-15 RX ORDER — PROPOFOL 10 MG/ML
INJECTION, EMULSION INTRAVENOUS AS NEEDED
Status: DISCONTINUED | OUTPATIENT
Start: 2023-11-15 | End: 2023-11-15 | Stop reason: SURG

## 2023-11-15 RX ORDER — HYDROCODONE BITARTRATE AND ACETAMINOPHEN 5; 325 MG/1; MG/1
1 TABLET ORAL EVERY 6 HOURS PRN
Qty: 28 TABLET | Refills: 0 | Status: SHIPPED | OUTPATIENT
Start: 2023-11-15

## 2023-11-15 RX ORDER — MEPERIDINE HYDROCHLORIDE 25 MG/ML
12.5 INJECTION INTRAMUSCULAR; INTRAVENOUS; SUBCUTANEOUS
Status: DISCONTINUED | OUTPATIENT
Start: 2023-11-15 | End: 2023-11-15 | Stop reason: HOSPADM

## 2023-11-15 RX ORDER — FENTANYL CITRATE 50 UG/ML
INJECTION, SOLUTION INTRAMUSCULAR; INTRAVENOUS AS NEEDED
Status: DISCONTINUED | OUTPATIENT
Start: 2023-11-15 | End: 2023-11-15 | Stop reason: SURG

## 2023-11-15 RX ORDER — OXYCODONE HYDROCHLORIDE 5 MG/1
5 TABLET ORAL ONCE AS NEEDED
Status: COMPLETED | OUTPATIENT
Start: 2023-11-15 | End: 2023-11-15

## 2023-11-15 RX ORDER — LIDOCAINE HYDROCHLORIDE AND EPINEPHRINE 10; 10 MG/ML; UG/ML
INJECTION, SOLUTION INFILTRATION; PERINEURAL AS NEEDED
Status: DISCONTINUED | OUTPATIENT
Start: 2023-11-15 | End: 2023-11-15 | Stop reason: HOSPADM

## 2023-11-15 RX ORDER — FLUMAZENIL 0.1 MG/ML
0.1 INJECTION INTRAVENOUS AS NEEDED
Status: DISCONTINUED | OUTPATIENT
Start: 2023-11-15 | End: 2023-11-15 | Stop reason: HOSPADM

## 2023-11-15 RX ORDER — LIDOCAINE HYDROCHLORIDE 10 MG/ML
INJECTION, SOLUTION EPIDURAL; INFILTRATION; INTRACAUDAL; PERINEURAL AS NEEDED
Status: DISCONTINUED | OUTPATIENT
Start: 2023-11-15 | End: 2023-11-15 | Stop reason: SURG

## 2023-11-15 RX ORDER — NALOXONE HCL 0.4 MG/ML
0.4 VIAL (ML) INJECTION AS NEEDED
Status: DISCONTINUED | OUTPATIENT
Start: 2023-11-15 | End: 2023-11-15 | Stop reason: HOSPADM

## 2023-11-15 RX ORDER — CYCLOBENZAPRINE HCL 10 MG
10 TABLET ORAL 3 TIMES DAILY PRN
Qty: 21 TABLET | Refills: 0 | Status: SHIPPED | OUTPATIENT
Start: 2023-11-15

## 2023-11-15 RX ORDER — LABETALOL HYDROCHLORIDE 5 MG/ML
5 INJECTION, SOLUTION INTRAVENOUS
Status: DISCONTINUED | OUTPATIENT
Start: 2023-11-15 | End: 2023-11-15 | Stop reason: HOSPADM

## 2023-11-15 RX ORDER — HYDRALAZINE HYDROCHLORIDE 20 MG/ML
5 INJECTION INTRAMUSCULAR; INTRAVENOUS
Status: DISCONTINUED | OUTPATIENT
Start: 2023-11-15 | End: 2023-11-15 | Stop reason: HOSPADM

## 2023-11-15 RX ORDER — MIDAZOLAM HYDROCHLORIDE 1 MG/ML
INJECTION INTRAMUSCULAR; INTRAVENOUS AS NEEDED
Status: DISCONTINUED | OUTPATIENT
Start: 2023-11-15 | End: 2023-11-15 | Stop reason: SURG

## 2023-11-15 RX ORDER — DIPHENHYDRAMINE HYDROCHLORIDE 50 MG/ML
12.5 INJECTION INTRAMUSCULAR; INTRAVENOUS ONCE AS NEEDED
Status: DISCONTINUED | OUTPATIENT
Start: 2023-11-15 | End: 2023-11-15 | Stop reason: HOSPADM

## 2023-11-15 RX ADMIN — HYDROMORPHONE HYDROCHLORIDE 1 MG: 1 INJECTION, SOLUTION INTRAMUSCULAR; INTRAVENOUS; SUBCUTANEOUS at 13:34

## 2023-11-15 RX ADMIN — MIDAZOLAM 2 MG: 1 INJECTION INTRAMUSCULAR; INTRAVENOUS at 12:59

## 2023-11-15 RX ADMIN — FENTANYL CITRATE 25 MCG: 50 INJECTION, SOLUTION INTRAMUSCULAR; INTRAVENOUS at 15:15

## 2023-11-15 RX ADMIN — SODIUM CHLORIDE, SODIUM LACTATE, POTASSIUM CHLORIDE, AND CALCIUM CHLORIDE: .6; .31; .03; .02 INJECTION, SOLUTION INTRAVENOUS at 13:00

## 2023-11-15 RX ADMIN — OXYCODONE 5 MG: 5 TABLET ORAL at 15:50

## 2023-11-15 RX ADMIN — DEXAMETHASONE SODIUM PHOSPHATE 8 MG: 4 INJECTION, SOLUTION INTRAMUSCULAR; INTRAVENOUS at 13:29

## 2023-11-15 RX ADMIN — ONDANSETRON 4 MG: 2 INJECTION INTRAMUSCULAR; INTRAVENOUS at 13:29

## 2023-11-15 RX ADMIN — SODIUM CHLORIDE 3 G: 900 INJECTION INTRAVENOUS at 13:10

## 2023-11-15 RX ADMIN — HYDROMORPHONE HYDROCHLORIDE 0.5 MG: 1 INJECTION, SOLUTION INTRAMUSCULAR; INTRAVENOUS; SUBCUTANEOUS at 16:07

## 2023-11-15 RX ADMIN — FENTANYL CITRATE 50 MCG: 50 INJECTION, SOLUTION INTRAMUSCULAR; INTRAVENOUS at 14:41

## 2023-11-15 RX ADMIN — FENTANYL CITRATE 25 MCG: 50 INJECTION, SOLUTION INTRAMUSCULAR; INTRAVENOUS at 15:20

## 2023-11-15 RX ADMIN — LIDOCAINE HYDROCHLORIDE 50 MG: 10 INJECTION, SOLUTION EPIDURAL; INFILTRATION; INTRACAUDAL; PERINEURAL at 13:03

## 2023-11-15 RX ADMIN — HYDROMORPHONE HYDROCHLORIDE 0.5 MG: 1 INJECTION, SOLUTION INTRAMUSCULAR; INTRAVENOUS; SUBCUTANEOUS at 15:50

## 2023-11-15 RX ADMIN — ROCURONIUM BROMIDE 10 MG: 10 INJECTION, SOLUTION INTRAVENOUS at 14:34

## 2023-11-15 RX ADMIN — ROCURONIUM BROMIDE 50 MG: 10 INJECTION, SOLUTION INTRAVENOUS at 13:03

## 2023-11-15 RX ADMIN — FENTANYL CITRATE 100 MCG: 50 INJECTION, SOLUTION INTRAMUSCULAR; INTRAVENOUS at 13:03

## 2023-11-15 RX ADMIN — ROCURONIUM BROMIDE 20 MG: 10 INJECTION, SOLUTION INTRAVENOUS at 13:33

## 2023-11-15 RX ADMIN — PROPOFOL 250 MG: 10 INJECTION, EMULSION INTRAVENOUS at 13:03

## 2023-11-15 RX ADMIN — ROCURONIUM BROMIDE 10 MG: 10 INJECTION, SOLUTION INTRAVENOUS at 14:00

## 2023-11-15 RX ADMIN — SUGAMMADEX 200 MG: 100 INJECTION, SOLUTION INTRAVENOUS at 15:11

## 2023-11-15 NOTE — OP NOTE
Neurosurgical operative report:    Patient name: Segun Cobb  Medical record number: [id number]  Date of procedure: November 15, 2023    Preoperative diagnosis: Right-sided eccentric L5-S1 disc herniation causing chronic right-sided S1 radiculopathy    Postoperative diagnosis: Right-sided eccentric L5-S1 disc herniation causing chronic right-sided S1 radiculopathy    Procedure performed: Right-sided L5-S1 laminotomy, medial facetectomy, foraminotomies and microdiscectomy, utilization of intraoperative microscope    Surgeon: Dr. Dimas Nguyen  Assistant: Kodak Omer  Anesthesia: General endotracheal anesthetic  Specimens: None  Blood loss: 25 cc  Drains: None  Complications: None immediate    Indications: This is a 43-year-old gentleman without significant health history who presented with a chronic right-sided S1 radiculopathy.  It had worsened over the last 5 to 6 months.  He failed medication management.  I discussed with him the possibility of other conservative measures but after explanation of the risks and benefits he elected to proceed with an operative intervention.    Description of procedure:  The patient was identified in the preoperative holding area via name and medical record number.  His history, physical exam, consent were reviewed and found to be correct and appropriate for proceeding with surgery.  He was marked over the intended surgical site.  He is brought back to the operating room and handed over to anesthesia for induction of general endotracheal anesthetic.  He was transferred from the hospital bed to the operating room table in the prone position.  His back was shaved of hair and cleaned with chlorhexidine and alcohol.  Incision was marked out based on anatomic and radiographic guidance.  Local anesthetic was instilled.  Timeout was performed and all categories were found to be correct and appropriate for proceeding with surgery.  Incision was made with 15 blade and carried down with  Bovie electrocautery.  Fascia was cleared off with a Ryan instrument.  Self-retaining retractor was brought in.  Fascia was opened over the spinous processes of L5 and S1.  Right-sided eccentric subperiosteal dissection of along the spinous process and lamina occurred at L5 and S1.  Radiographic imaging confirmed proper level.  Self-retaining retractor was brought into provide adequate visualization.  Microscope was brought on the field.  Laminotomy and medial facetectomy were accomplished with power drill bit.  The ligament was taken down.  Foraminotomies was accomplished with Kerrison bone punch.  The thecal sac and nerve root were clearly identified.  These were retracted medially.  The disc base was entered with an 11 blade.  Multiple large pieces of disc material were resected.  The wound was copiously irrigated and hemostasis was obtained.  The decompression was confirmed to be successful.  Depo-Medrol was placed onto the thecal sac and nerve root.  The self-retaining retractor was removed.  The microscope was taken off the field.  The fascia was closed with interrupted 0 Vicryl stitches.  The subcutaneous tissue was closed with interrupted 2-0 Vicryl stitches.  The skin was brought together with a running V-Loc Monocryl.  Skin was covered with glue and a dressing.  The drapes were taken down and the patient was transferred back to the hospital bed in the supine position.  He was extubated intraoperatively and he was transported to the postanesthesia care unit.  At the time of him being dropped off at that location no complications were evident.  At the end of the case all counts were found to be correct.  I was present and scrubbed for all key portions of the procedure and immediately available at all times.  The assistance of the surgical first assist was essential for successful completion of surgical intervention including assistance with opening, closing, suction, retraction.  End of dictation.  Thank you  very much.

## 2023-11-15 NOTE — ANESTHESIA POSTPROCEDURE EVALUATION
Patient: Martinez Cobb    Procedure Summary       Date: 11/15/23 Room / Location: [facility] OR  / [facility] MAIN OR    Anesthesia Start: 1300 Anesthesia Stop: 1535    Procedure: LUMBAR LAMINOTOMY, MEDIAL FACETECTOMY, FORAMINOTOMIES, AND MICRODISCECTOMY, L5- S1 (Right: Spine Lumbar) Diagnosis:       Lumbar radiculopathy      (Lumbar radiculopathy [M54.16])    Surgeons: Dimas Nguyen MD Provider: Joel Gallagher MD    Anesthesia Type: general, ERAS Protocol ASA Status: 2            Anesthesia Type: general, ERAS Protocol    Vitals  Vitals Value Taken Time   /77 11/15/23 1620   Temp 97.6 °F (36.4 °C) 11/15/23 1615   Pulse 64 11/15/23 1620   Resp 12 11/15/23 1615   SpO2 100 % 11/15/23 1620           Post Anesthesia Care and Evaluation    Patient location during evaluation: PACU  Patient participation: complete - patient participated  Level of consciousness: awake  Pain scale: See nurse's notes for pain score.  Pain management: adequate    Airway patency: patent  Anesthetic complications: No anesthetic complications  PONV Status: none  Cardiovascular status: acceptable  Respiratory status: acceptable and spontaneous ventilation  Hydration status: acceptable    Comments: Patient seen and examined postoperatively; vital signs stable; SpO2 greater than or equal to 90%; cardiopulmonary status stable; nausea/vomiting adequately controlled; pain adequately controlled; no apparent anesthesia complications; patient discharged from anesthesia care when discharge criteria were met

## 2023-11-15 NOTE — DISCHARGE SUMMARY
Discharge Summary    Patient: Martinez Cobb  : 1980    Patient Care Team:  Rashel Manrique MD as PCP - Dominic Townsend APRN as Nurse Practitioner (Family Medicine)    Date of Admit: 11/15/2023    Date of Discharge:  11/15/2023    Discharge Diagnosis:  Lumbar radiculopathy      Procedures Performed  Procedure(s):  LUMBAR LAMINOTOMY, MEDIAL FACETECTOMY, FORAMINOTOMIES, AND MICRODISCECTOMY, L5- S1       Complications: None    Consultants:   Consults       No orders found from 10/17/2023 to 2023.            Condition on Discharge: stable    Discharge disposition: home    HPI: Martinez Cobb is a 43 y.o. male who presented with chronic right-sided radiculopathy secondary to disc herniation and stenosis at L5-S1.  Patient failed extensive conservative measures and was taken to the OR for the above procedure with Dr. Nguyen on 11/15/2023.    Hospital Course: Patient underwent surgery as scheduled.  Patient was transferred to PACU after the procedure.  Patient was observed in recovery until discharge criteria were met at which point they were discharged home to self-care.    Vitals:    11/15/23 0947   BP: 130/80   Pulse: 65   Resp: 17   Temp: 98 °F (36.7 °C)   SpO2: 95%         Lab Results (last 24 hours)       ** No results found for the last 24 hours. **                  Discharge Physical Exam:    General  - WD/WN male, appears their stated age, awake, cooperative, in no acute distress  HEENT  - Normocephalic, atraumatic, PERRLA, EOM intact  Respiratory  - Normal respiratory rate and effort  Abdomen  - Flat, soft, NT/ND  Musculoskeletal  - Moves all extremities well, no joint swelling/tenderness  Skin  - Surgical incision well approximated, clean and dry, no swelling, redness, or drainage  NEUROLOGIC  - Moves all extremities symmetrically and with good strength  - Sensation intact throughout      Discharge Medications  Inspect has been reviewed and narcotic consent is on  file in the patient's chart.     Your medication list        START taking these medications        Instructions Last Dose Given Next Dose Due   cyclobenzaprine 10 MG tablet  Commonly known as: FLEXERIL      Take 1 tablet by mouth 3 (Three) Times a Day As Needed for Muscle Spasms.       HYDROcodone-acetaminophen 5-325 MG per tablet  Commonly known as: Norco      Take 1 tablet by mouth Every 6 (Six) Hours As Needed for Severe Pain.       naloxone 4 MG/0.1ML nasal spray  Commonly known as: NARCAN      Call 911. Don't prime. [place] in 1 nostril for overdose. Repeat in 2-3 minutes in other nostril if no or minimal breathing/responsiveness.              CONTINUE taking these medications        Instructions Last Dose Given Next Dose Due   gabapentin 300 MG capsule  Commonly known as: NEURONTIN      Take 1 capsule by mouth Every Night.       traMADol 50 MG tablet  Commonly known as: ULTRAM      Take 1 tablet by mouth Every 6 (Six) Hours As Needed for Moderate Pain.                 Where to Get Your Medications        These medications were sent to [address] [place] [address]      Hours: Monday to Friday 7 AM to 7 PM Phone: [phone]   cyclobenzaprine 10 MG tablet  HYDROcodone-acetaminophen 5-325 MG per tablet  naloxone 4 MG/0.1ML nasal spray         Discharge Diet: Regular, advance as tolerated      Activity at Discharge: No bending or twisting at the waist.  No lifting greater than 5 pounds.  No driving for 1 week.  Do not soak or submerge incision underwater for 6 weeks.      Call for: questions or concerns    Follow-up Appointments  Future Appointments   Date Time Provider Department Center   12/5/2023  9:00 AM Dimas Nguyen MD [facility]      Follow-up Information       Rashel Manrique MD .    Specialty: Family Medicine  Contact information:  [address] [place] [address] [place] [address] [phone]               Dimas Nguyen MD. Go on 12/5/2023.     Specialties: Neurosurgery, Spine Surgery  Contact information:  [facility] [address] [place] [address]  [phone]                               I discussed the discharge instructions with patient    EVI Avendano  11/15/23  15:23 EST    20 min spent in reviewing records, discussion and examination of the patient and discussion with other members of the patient's medical team.           Part of this note may be an electronic transcription/translation of spoken language to printed text using the Dragon Dictation System.

## 2023-11-15 NOTE — DISCHARGE INSTRUCTIONS
INSTRUCTION & CARE AFTER YOUR SPINE SURGERY      No bending or twisting at the waist.    No lifting anything heavier than 5lbs     No driving for one week. We recommend that you limit riding in a car because of the risk of an accident. If you are a passenger, wear your seatbelt.     Walk as much as possible. Laying around or sitting around all day puts extra stress on the spine. Change your position every two hours.      Remove the bandage on the second day after surgery and leave the incision open to air. If you notice any redness, swelling or drainage, call the office.     You may shower 48 hours after surgery. Don't let the water beat directly on the incision. Gently pat the incision with mild soap and water, pat dry. Do not submerge in water, to include hot pools, tubs, pools, lakes, ocean x 4 weeks.     Don't be alarmed if you experience some of your pre-operative symptoms after going home. This is not uncommon and normally goes away in a few days but may last longer. Pain, aching and stiffness in your neck, back, and down your legs is common. If you have any questions or concerns don't hesitate to call the office.    Take pain medication only as prescribed and as needed.     Do NOT take any anti-inflammatory medications, to include Aleve, Ibuprofen, Mobic, Celebrex, Naproxen, Aspirin.      You may take Tylenol (acetaminophen) but use this sparingly since your pain medication also contains acetaminophen.     Constipation is common after surgery. Your bowels are slow due to anesthesia, opioid pain medications and lack of movement. We do not want you to strain to use the restroom. Use a mild stool softener or laxative as needed. Drink plenty of liquids to include water and juice.      Nausea is common with pain medications. To reduce this chance, do not take your medication on an empty stomach.    Follow-up with our outpatient neurosurgery clinic 2 weeks after surgery.  If you do not have an appointment scheduled  by the time you leave the hospital, call our office to set up an appointment as soon as possible.   VITAL SIGNS: I have reviewed nursing notes and confirm.  CONSTITUTIONAL: non-toxic, well appearing  SKIN: no rash, no petechiae.  EYES: PERRL, EOMI, pink conjunctiva, anicteric  ENT: tongue midline, no exudates, MMM  NECK: Supple; no meningismus, no JVD  CARD: + irregular rhythm, no murmurs, equal radial pulses bilaterally 2+  RESP: CTAB, no respiratory distress  ABD: Soft, non-tender, non-distended  EXT: No edema. No calves tenderness  NEURO: Alert, orientedx3 no focal deficits

## 2023-11-15 NOTE — ANESTHESIA PROCEDURE NOTES
Airway  Urgency: elective    Date/Time: 11/15/2023 1:05 PM  Difficult airway    General Information and Staff    Patient location during procedure: OR    Indications and Patient Condition  Indications for airway management: airway protection    Preoxygenated: yes  MILS maintained throughout  Mask difficulty assessment: 1 - vent by mask    Final Airway Details  Final airway type: endotracheal airway      Successful airway: ETT  Cuffed: yes   Successful intubation technique: video laryngoscopy  Facilitating devices/methods: intubating stylet  Endotracheal tube insertion site: oral  Blade: Blevins  Blade size: 4  ETT size (mm): 7.5  Cormack-Lehane Classification: grade I - full view of glottis  Placement verified by: chest auscultation and capnometry   Cuff volume (mL): 9  Number of attempts at approach: 1  Assessment: lips, teeth, and gum same as pre-op and atraumatic intubation

## 2023-11-15 NOTE — ANESTHESIA PREPROCEDURE EVALUATION
Anesthesia Evaluation     NPO Solid Status: > 8 hours  NPO Liquid Status: > 8 hours           Airway   Mallampati: I  TM distance: >3 FB  Neck ROM: full  No difficulty expected  Dental - normal exam     Pulmonary - normal exam   Cardiovascular - normal exam    (+) hyperlipidemia      Neuro/Psych  (+) numbness, psychiatric history  GI/Hepatic/Renal/Endo      Musculoskeletal     Abdominal  - normal exam    Bowel sounds: normal.   Substance History      OB/GYN          Other   arthritis,     ROS/Med Hx Other: 2/2023 MAC4 GRI              Anesthesia Plan    ASA 2     general and ERAS Protocol   total IV anesthesia  intravenous induction     Anesthetic plan, risks, benefits, and alternatives have been provided, discussed and informed consent has been obtained with: patient.  Pre-procedure education provided  Plan discussed with CRNA.    CODE STATUS:

## 2023-11-15 NOTE — H&P
Martinez Cobb is a 43 y.o. male is here today for follow-up for back pain and to discuss surgery. In the office today patient reports of back pain that radiates into bilateral legs with numbness and weakness.           Chief Complaint   Patient presents with    Back Pain       Follow up extended          Previous treatment: Dexamethasone      HPI: This is a 43-year-old gentleman who was last evaluated by Tamara Bustamante on September 5, 2023.  I was referred this patient from her.  Based on my discussion with him today he has had approximately 18 months of slowly progressive right leg pain.  This has significantly worsened over the last 4 to 5 months.  It is life altering it inhibits his ability to have a high quality of life.  His pain is almost exclusively in his leg and resembles an S1 pattern.  He was placed on an oral steroid Dosepak in July as well as being provided with muscle relaxers and tramadol.  He did not receive significant benefit from this.  CT imaging was obtained and reviewed showing signs of a possible right-sided L5-S1 disc herniation.  Tamara felt as though he was not appropriate for physical therapy and order him an MRI as well as a flexion-extension x-ray and had him return to see me.  She also provided him with a intramuscular Toradol shot.  He also attempted gabapentin without profound relief.     Medical History        Past Medical History:   Diagnosis Date    Low back pain 8/22    Lumbosacral disc disease 5/23    Right shoulder pain      Tennis elbow       left            Surgical History         Past Surgical History:   Procedure Laterality Date    FOOT SURGERY Bilateral      LATERAL EPICONDYLE RELEASE Left 01/05/2021     Procedure: TENNIS ELBOW RELEASE LATERAL EPICONDYLAR REPAIR;  Surgeon: Karson Mcpherson MD;  Location: [facility];  Service: Orthopedics;  Laterality: Left;    SHOULDER ARTHROSCOPY W/ ROTATOR CUFF REPAIR Right 02/24/2023     Procedure: SHOULDER ARTHROSCOPY WITH  ROTATOR CUFF REPAIR distal clavicle excision, subacromial decopression,  extensive debridement;  Surgeon: Karson Mcpherson MD;  Location: [facility] MAIN OR;  Service: Orthopedics;  Laterality: Right;    SHOULDER SURGERY Bilateral       left RCR, right RCR, and right labrum repair                   Current Outpatient Medications on File Prior to Visit   Medication Sig Dispense Refill    gabapentin (NEURONTIN) 300 MG capsule Take 1 capsule by mouth Every Night. (Patient not taking: Reported on 2023) 30 capsule 0    traMADol (ULTRAM) 50 MG tablet Take 1 tablet by mouth Every 6 (Six) Hours As Needed for Moderate Pain. (Patient not taking: Reported on 2023) 30 tablet 0    [DISCONTINUED] dexAMETHasone (DECADRON) 1.5 MG tablet Take 1 tablet by mouth Take As Directed. Day 1 take 3 am, 3 pm ,Day 2 take 3 am, 2 pm,Day 3 take 3 am, 2 pm,Day 4 take 2 am, 2 pm  ,Day 5 take 2 am, 2 pm,Day 6 take 2 am, 1 pm,Day 7 take 2 am, 1 pm, Day 8 take 1 am, 1 pm,Day 9 take 1 am, 1 pm,Day 10 take 1 am (Patient not taking: Reported on 2023) 35 tablet 0      No current facility-administered medications on file prior to visit.         No Known Allergies      Social History   Social History            Socioeconomic History    Marital status:    Tobacco Use    Smoking status: Former       Packs/day: 0.25       Years: 10.00       Pack years: 2.50       Types: Cigarettes       Start date: 2012       Quit date: 2012       Years since quittin.1       Passive exposure: Past    Smokeless tobacco: Current       Types: Chew   Vaping Use    Vaping Use: Never used   Substance and Sexual Activity    Alcohol use: Yes       Alcohol/week: 3.0 standard drinks       Types: 3 Cans of beer per week    Drug use: No    Sexual activity: Defer               Review of Systems   Constitutional:  Positive for activity change.   HENT: Negative.     Eyes: Negative.    Respiratory: Negative.     Cardiovascular: Negative.   "  Gastrointestinal: Negative.    Endocrine: Negative.    Genitourinary: Negative.    Musculoskeletal:  Positive for arthralgias, back pain and myalgias.   Skin: Negative.    Allergic/Immunologic: Negative.    Neurological:  Positive for weakness (Jose Manuel legs) and numbness (Jose Manuel legs).   Hematological: Negative.    Psychiatric/Behavioral:  Positive for sleep disturbance.        Physical Examination:                Vitals       Vitals:     09/22/23 1341   BP: 133/89   BP Location: Left arm   Patient Position: Sitting   Cuff Size: Adult   Pulse: 98   Resp: 18   Weight: 119 kg (263 lb)   Height: 198.1 cm (78\")   PainSc:   5   PainLoc: Back            Physical Exam                    Vitals       Vitals:     09/22/23 1341   PainSc:   5   PainLoc: Back               Neurological Exam   Neurological examination today appears stable compared to Tamara's evaluation a couple of weeks ago.  He does have some plantarflexion weakness on the right as well as indication of the right sided straight leg raise.        Result Review  The following data was reviewed by: Dimas Nguyen MD on 09/22/2023:     Data reviewed : Radiologic studies MRI of the lumbar spine shows a right-sided eccentric L5-S1 disc herniation clearly contacting the S1 nerve root.  Flexion-extension x-rays of the lumbar spine do not show any dynamic instability.     Assessment/plan:  This is a 43-year-old gentleman without significant health history including absence of being on any blood thinning medications who appears to have a chronic right-sided S1 radiculopathy.  This has not improved and in fact has acutely worsened over the last 4 to 5 months.  He has failed medication management.  I discussed with him today the possibility of a selective nerve root block and subsequent physical therapy versus surgical decompression.  After explanation of the risks and benefits he has elected to proceed with an L5-S1 laminotomy, microdiscectomy on the right side.  We will work " to get this scheduled as soon as possible after completion of necessary preoperative evaluation.  I have encouraged him to call with any questions or concerns.

## 2023-12-04 NOTE — PROGRESS NOTES
Neurosurgical Consultation      Martinez Cobb is a 43 y.o. male is here today for a post op follow-up.    Chief Complaint   Patient presents with    Post-op     Follow up         Previous treatment:  LUMBAR LAMINOTOMY, MEDIAL FACETECTOMY, FORAMINOTOMIES, AND MICRODISCECTOMY, L5- S1 done on 11/15/2023    HPI: This is a 43-year-old gentleman without significant health history who presented to the clinic with a chronic right-sided S1 radiculopathy.  It had worsened over approximately 6 months.  He failed medication management.  I discussed with him the option of other conservative measures but after explanation of the risks and benefits he elected to proceed with an operative intervention.  He underwent a right-sided L5-S1 lumbar microdiscectomy on November 15, 2023.  He had a large disc herniation.  This was evident on preoperative imaging as well as intraoperative findings.  He did not have any preoperative instability on x-rays.  He went home the day of surgery.  He returns approximately 3 weeks from surgery.  He is doing very well.  He has no leg pain.  He has no abnormal paresthesias or numbness in his leg.  He does not have significant back pain.  He is very happy with surgical results.  His incision is well-healing.    Past Medical History:   Diagnosis Date    Low back pain 8/22    Lumbosacral disc disease 5/23    Right shoulder pain     Tennis elbow     left        Past Surgical History:   Procedure Laterality Date    FOOT SURGERY Bilateral     LATERAL EPICONDYLE RELEASE Left 01/05/2021    Procedure: TENNIS ELBOW RELEASE LATERAL EPICONDYLAR REPAIR;  Surgeon: Karson Mcpherson MD;  Location: [facility] MAIN OR;  Service: Orthopedics;  Laterality: Left;    LUMBAR LAMINECTOMY Right 11/15/2023    Procedure: LUMBAR LAMINOTOMY, MEDIAL FACETECTOMY, FORAMINOTOMIES, AND MICRODISCECTOMY, L5- S1;  Surgeon: Dimas Nguyen MD;  Location: [facility] MAIN OR;  Service: Neurosurgery;  Laterality: Right;    SHOULDER  ARTHROSCOPY W/ ROTATOR CUFF REPAIR Right 2023    Procedure: SHOULDER ARTHROSCOPY WITH ROTATOR CUFF REPAIR distal clavicle excision, subacromial decopression,  extensive debridement;  Surgeon: Karson Mcpherson MD;  Location: [facility] MAIN OR;  Service: Orthopedics;  Laterality: Right;    SHOULDER SURGERY Bilateral     left RCR, right RCR, and right labrum repair        Current Outpatient Medications on File Prior to Visit   Medication Sig Dispense Refill    [DISCONTINUED] cyclobenzaprine (FLEXERIL) 10 MG tablet Take 1 tablet by mouth 3 (Three) Times a Day As Needed for Muscle Spasms. 21 tablet 0    [DISCONTINUED] gabapentin (NEURONTIN) 300 MG capsule Take 1 capsule by mouth Every Night. 30 capsule 0    [DISCONTINUED] HYDROcodone-acetaminophen (Norco) 5-325 MG per tablet Take 1 tablet by mouth Every 6 (Six) Hours As Needed for Severe Pain. 28 tablet 0    [DISCONTINUED] naloxone (NARCAN) 4 MG/0.1ML nasal spray Call 911. Don't prime. [place] in 1 nostril for overdose. Repeat in 2-3 minutes in other nostril if no or minimal breathing/responsiveness. 2 each 0    [DISCONTINUED] traMADol (ULTRAM) 50 MG tablet Take 1 tablet by mouth Every 6 (Six) Hours As Needed for Moderate Pain. 30 tablet 0     No current facility-administered medications on file prior to visit.        No Known Allergies     Social History     Socioeconomic History    Marital status:    Tobacco Use    Smoking status: Former     Packs/day: 0.25     Years: 10.00     Additional pack years: 0.00     Total pack years: 2.50     Types: Cigarettes     Start date: 2012     Quit date: 2012     Years since quittin.3     Passive exposure: Past    Smokeless tobacco: Former     Types: Chew     Quit date: 2023   Vaping Use    Vaping Use: Never used   Substance and Sexual Activity    Alcohol use: Yes     Alcohol/week: 3.0 standard drinks of alcohol     Types: 3 Cans of beer per week    Drug use: No    Sexual activity: Defer          Review  "of Systems   Constitutional:  Positive for activity change.   HENT: Negative.     Eyes: Negative.    Respiratory: Negative.     Cardiovascular: Negative.    Gastrointestinal: Negative.    Endocrine: Negative.    Genitourinary: Negative.    Musculoskeletal: Negative.    Skin: Negative.    Allergic/Immunologic: Negative.    Neurological: Negative.    Hematological: Negative.    Psychiatric/Behavioral: Negative.          Physical Examination:     Vitals:    12/05/23 0900   BP: 131/80   Pulse: 78   Weight: 119 kg (263 lb 6.4 oz)   Height: 198.1 cm (77.99\")   PainSc: 0-No pain        Physical Exam     Neurological Exam   Neurological examination is improved compared to my preoperative evaluation secondary to the absence of radiculopathy.  Incision is well-healing without any signs of breakdown or infection.    Result Review  The following data was reviewed by: Dimas Nguyen MD on 12/05/2023:    Data reviewed : Radiologic studies no new imaging reviewed today.      Assessment/plan:  This is a 43-year-old gentleman with a large right-sided L5-S1 disc herniation causing chronic radiculopathy.  He is status post lumbar decompression approximately 3 weeks ago.  He is doing extremely well with absence of radiculopathy at this juncture.  His incision is well-healing.  I do recommend a course of physical therapy.  He will follow-up with me in approximately 2 months for clinical reevaluation.  I will order a flexion-extension x-ray at that juncture.  I have encouraged him to call with any questions or concerns.    Diagnoses and all orders for this visit:    1. Lumbar radiculopathy (Primary)  -     XR Spine Lumbar Complete With Flex & Ext; Future  -     Ambulatory Referral to Physical Therapy POST OP         Return in about 2 months (around 2/5/2024).            Dimas Nguyen MD  "

## 2023-12-05 ENCOUNTER — OFFICE VISIT (OUTPATIENT)
Dept: NEUROSURGERY | Facility: CLINIC | Age: 43
End: 2023-12-05
Payer: COMMERCIAL

## 2023-12-05 VITALS
HEART RATE: 78 BPM | HEIGHT: 78 IN | WEIGHT: 263.4 LBS | DIASTOLIC BLOOD PRESSURE: 80 MMHG | BODY MASS INDEX: 30.48 KG/M2 | SYSTOLIC BLOOD PRESSURE: 131 MMHG

## 2023-12-05 DIAGNOSIS — M54.16 LUMBAR RADICULOPATHY: Primary | ICD-10-CM

## 2023-12-05 PROCEDURE — 99024 POSTOP FOLLOW-UP VISIT: CPT | Performed by: NEUROLOGICAL SURGERY

## 2023-12-08 ENCOUNTER — TREATMENT (OUTPATIENT)
Dept: PHYSICAL THERAPY | Facility: CLINIC | Age: 43
End: 2023-12-08
Payer: COMMERCIAL

## 2023-12-08 DIAGNOSIS — M54.16 RADICULOPATHY, LUMBAR REGION: Primary | ICD-10-CM

## 2023-12-08 NOTE — PROGRESS NOTES
"Physical Therapy Initial Evaluation and Plan of Care      [address] [place]   [address]    Patient: Martinez Cobb   : 1980  Diagnosis/ICD-10 Code:  Radiculopathy, lumbar region [M54.16]  Referring practitioner: Dimas Nguyen MD  Date of Initial Visit: 2023  Today's Date: 2023  Patient seen for 1 sessions           Subjective Questionnaire: Oswestry: 4/50 indicates 8% impairment      Subjective Evaluation    History of Present Illness  Date of surgery: 11/15/2023  Mechanism of injury: - Patient reported onset of \" sciatica\" about 2 years prior to surgery.  Patient reported symptoms did not improve with conservative management  - Pt reported he immediately woke without LE symptoms post surgical intervention   - No numbness/tingling of Les  - No pain   - No lifting greater 20 pounds; no bending; no twisting         Patient Occupation: Retired  Pain  No pain reported    Social Support  Lives in: multiple-level home  Lives with: spouse    Treatments  Previous treatment: physical therapy ((R) shoulder RCR)  Patient Goals  Patient goals for therapy: increased strength, return to sport/leisure activities and return to work  Patient goal: return to some kind of work; return to weight lifting; strengthen core       Per Neurosurgery note 23 (via EMR): This is a 43-year-old gentleman without significant health history who presented to the clinic with a chronic right-sided S1 radiculopathy.  It had worsened over approximately 6 months.  He failed medication management.  I discussed with him the option of other conservative measures but after explanation of the risks and benefits he elected to proceed with an operative intervention.  He underwent a right-sided L5-S1 lumbar microdiscectomy on November 15, 2023.  He had a large disc herniation.  This was evident on preoperative imaging as well as intraoperative findings.  He did not have any preoperative instability on x-rays.  " He went home the day of surgery.  He returns approximately 3 weeks from surgery.  He is doing very well.  He has no leg pain.  He has no abnormal paresthesias or numbness in his leg.  He does not have significant back pain.  He is very happy with surgical results.  His incision is well-healing.     PMHX:  (R) RCR ; lateral epicondyle release; (B) foot surgery   Objective          Static Posture     Lumbar Spine   Flattened and decreased lordosis.     Postural Observations  Seated posture: fair  Standing posture: fair      Neurological Testing     Sensation     Lumbar   Left   Intact: light touch    Right   Intact: light touch    Strength/Myotome Testing     Left Hip   Planes of Motion   Flexion: 5  Extension: 5  Abduction: 5  External rotation: 5  Internal rotation: 5    Right Hip   Planes of Motion   Flexion: 5  Extension: 5  Abduction: 5  External rotation: 5  Internal rotation: 5    Left Knee   Flexion: 5  Extension: 5    Right Knee   Flexion: 5  Extension: 5    Left Ankle/Foot   Dorsiflexion: 5    Right Ankle/Foot   Dorsiflexion: 5    Muscle Activation     Additional Muscle Activation Details  Upper abs 5/5  Lower abs = 3/5    Tests     Lumbar     Left   Negative passive SLR.     Right   Negative passive SLR.     Left Hip   Positive Antonio and piriformis.   Negative ABIGAIL and FADIR.     Right Hip   Positive Antonio and piriformis.   Negative ABIGAIL and FADIR.     Lumbar Flexibility Comments:   Mild hip flexor, hamstring, ITB and piriformis flexibility deficits    Ambulation     Observational Gait   Gait: within functional limits           Assessment & Plan       Assessment  Impairments: abnormal muscle firing, abnormal muscle tone, impaired physical strength and lacks appropriate home exercise program   Functional limitations: carrying objects, lifting, stooping and unable to perform repetitive tasks   Assessment details: Pt is 44 y/o male presenting for OPPT evaluation s/p right L5-S1 lumbar microdiscectomy on  November 15, 2023.    Pt reports resolution of all lower extremity symptoms since surgery. Patient denied any recent pain.       Current restrictions: no bending, twisting or lifting greater than 20 pounds    Recommend skilled therapy to address deficits in core strength and stability as well as flexibility for progression toward return to weight lifting, returning to fitness facility, recreational activities and work.      Initial HEP issued and reviewed with patient.  Patient was able to return demo    Barriers to therapy: none noted  Prognosis: good    Goals  Plan Goals: SHORT TERM GOALS: Time for Goal Achievement: 3 weeks    1.  Patient to be compliant w/ the HEP and tolerate progression.                            2. Pt will continue to report 0/10 for ability to advance strengthening and flexibility activities without provocation of pain   LONG TERM GOALS: Time for Goal Achievement: D/C- 6 weeks  1.  Pt will be (I) with final, updated HEP handout to maintain progress and for progression toward return to gym.  2.  Pt will demonstrate improved lower abdominal strength =4/5 or greater for improved postural support and stability        Plan  Therapy options: will be seen for skilled therapy services  Planned modality interventions: electrical stimulation/Russian stimulation, cryotherapy, TENS and thermotherapy (hydrocollator packs)  Other planned modality interventions: dry needling  Planned therapy interventions: manual therapy, abdominal trunk stabilization, ADL retraining, neuromuscular re-education, spinal/joint mobilization, soft tissue mobilization, strengthening, stretching, therapeutic activities, functional ROM exercises, home exercise program, flexibility, body mechanics training and postural training  Frequency: 2x week (1-2 x per week)  Duration in visits: 12  Duration in weeks: 12  Treatment plan discussed with: patient        History # of Personal Factors and/or Comorbidities: MODERATE  (1-2)  Examination of Body System(s): # of elements: LOW (1-2)  Clinical Presentation: STABLE   Clinical Decision Making: LOW       Timed:         Manual Therapy:    0     mins  49673;     Therapeutic Exercise:    25     mins  32542;     Neuromuscular [place]:    0    mins  50501;    Therapeutic Activity:     10     mins  43891;     Gait Trainin     mins  65097;     Ultrasound:     0     mins  73068;    Ionto                               0    mins   01220  Self Care                       0     mins   39569  Aquatic                          0     mins 88947      Un-Timed:  Electrical Stimulation:    0     mins  68726 ( );  Dry Needling     0     mins self-pay  Traction     0     mins 13942  Low Eval     25     Mins  43710  Mod Eval     0     Mins  63660  High Eval                       0     Mins  97025  Re-Eval                           0    mins  09446        Timed Treatment:   35  mins   Total Treatment:     60   mins    PT SIGNATURE: Holly Connelly PT   [place] License#: [id number]      Electronically signed by Holly Connelly PT, 23, 7:53 AM EST      Initial Certification  Certification Period:  2023 thru 3/6/2024  I certify that the therapy services are furnished while this patient is under my care.  The services outlined above are required by this patient, and will be reviewed every 90 days.       Physician Signature:__________________________________________________    PHYSICIAN: Dimas Nguyen MD      DATE:     Please sign and return via fax to [phone].. Thank you, [facility].

## 2023-12-11 ENCOUNTER — TREATMENT (OUTPATIENT)
Dept: PHYSICAL THERAPY | Facility: CLINIC | Age: 43
End: 2023-12-11
Payer: COMMERCIAL

## 2023-12-11 DIAGNOSIS — M54.16 RADICULOPATHY, LUMBAR REGION: Primary | ICD-10-CM

## 2023-12-11 NOTE — PROGRESS NOTES
Physical Therapy Daily Treatment Note  [facility]   [address] [place] [address]      Patient: Martinez Cobb  : 1980  Referring Practitioner: Dimas Nguyen MD  Date of Initial Visit: Type: THERAPY  Noted: 2023  Today's Date: 2023  Patient seen for: 2 sessions      Visit Diagnoses:     ICD-10-CM ICD-9-CM   1. Radiculopathy, lumbar region  M54.16 724.4       Subjective   Martinez Cobb reports: he did well following last session. No increase in pain of the low back or legs. States he feels he was appropriately sore of lower abdominals. States he has been working on his HEP.       Pain Level (0-10): 0    Objective     See Exercise, Manual, and Modality Logs for complete treatment.     Patient Education: Continue current HEP, will progress next session if tolerated today well.     Assessment & Plan       Assessment  Assessment details: Continued with therex focused on core strengthening today. Tolerates progression without any provocation of pain and minimal abdominal soreness reported. Increased time with planks, with moderate difficulty, but task is able to be completed.    Goals  Plan Goals: Plan Goals: SHORT TERM GOALS: Time for Goal Achievement: 3 weeks    1.  Patient to be compliant w/ the HEP and tolerate progression. - PROGRESSING (compliant with current HEP, anticipate progression at next session)                             2. Pt will continue to report 0/10 for ability to advance strengthening and flexibility activities without provocation of pain -PROGRESSING    LONG TERM GOALS: Time for Goal Achievement: D/C- 6 weeks  1.  Pt will be (I) with final, updated HEP handout to maintain progress and for progression toward return to gym.    2.  Pt will demonstrate improved lower abdominal strength =4/5 or greater for improved postural support and stability      Progress per Plan of Care and Progress strengthening /stabilization /functional activity          Timed:          Manual Therapy:         mins  15225;     Therapeutic Exercise:    45     mins  02252;     Neuromuscular [place]:        mins  18489;    Therapeutic Activity:          mins  15067;     Gait Training:           mins  11919;     Ultrasound:          mins  98244;    Ionto                                   mins   96718  Self Care                            mins   38534      Un-Timed:  Electrical Stimulation:         mins  73015 ( );  Traction          mins 64380    No Charge:   [place]:        mins  Hydrocollator MHP:         mins      Timed Treatment:   45   mins   Total Treatment:     45   mins      Helen Gruber PTA  Physical Therapist Assistant  [place] License: [id number]

## 2023-12-13 ENCOUNTER — TELEPHONE (OUTPATIENT)
Dept: PHYSICAL THERAPY | Facility: CLINIC | Age: 43
End: 2023-12-13
Payer: COMMERCIAL

## 2023-12-13 NOTE — TELEPHONE ENCOUNTER
"  Caller: Martinez Cobb \"Leo\"    Relationship: Self         What was the call regarding: STUCK IN TRAFFIC NOT MOVIING        "

## 2024-01-16 ENCOUNTER — ANCILLARY ORDERS (OUTPATIENT)
Dept: OTHER | Facility: HOSPITAL | Age: 44
End: 2024-01-16
Payer: COMMERCIAL

## 2024-01-22 ENCOUNTER — TELEPHONE (OUTPATIENT)
Dept: NEUROSURGERY | Facility: CLINIC | Age: 44
End: 2024-01-22
Payer: COMMERCIAL

## 2024-01-29 ENCOUNTER — DOCUMENTATION (OUTPATIENT)
Dept: PHYSICAL THERAPY | Facility: CLINIC | Age: 44
End: 2024-01-29
Payer: COMMERCIAL

## 2024-01-29 NOTE — PROGRESS NOTES
Discharge Summary  Discharge Summary from Physical Therapy Report      Dates  PT visit: 12/8/23- 12/11/23  Number of Visits: 2 including initial evaluation      Discharge Status of Patient: See MD Note dated 12/8/23 - date of initial evaluation     Goals: Not Met    Discharge Plan: Patient to return to referring/providing physician    Comments   contacted patient via telephone.  Per patient, he did not feel as if he needed to schedule ongoing visits at this time.  Canceled appts on 12/13/12 and 12/18/23 and did not contact clinic or return following.     Date of Discharge 1/29/24        oHlly Connelly, PT  Physical Therapist

## 2024-10-23 ENCOUNTER — FLU SHOT (OUTPATIENT)
Dept: FAMILY MEDICINE CLINIC | Facility: CLINIC | Age: 44
End: 2024-10-23
Payer: COMMERCIAL

## 2024-10-23 DIAGNOSIS — Z23 NEED FOR INFLUENZA VACCINATION: Primary | ICD-10-CM

## 2024-10-23 PROCEDURE — 90471 IMMUNIZATION ADMIN: CPT | Performed by: STUDENT IN AN ORGANIZED HEALTH CARE EDUCATION/TRAINING PROGRAM

## 2024-10-23 PROCEDURE — 90656 IIV3 VACC NO PRSV 0.5 ML IM: CPT | Performed by: STUDENT IN AN ORGANIZED HEALTH CARE EDUCATION/TRAINING PROGRAM

## 2024-11-11 ENCOUNTER — LAB (OUTPATIENT)
Dept: FAMILY MEDICINE CLINIC | Facility: CLINIC | Age: 44
End: 2024-11-11
Payer: COMMERCIAL

## 2024-11-11 ENCOUNTER — OFFICE VISIT (OUTPATIENT)
Dept: FAMILY MEDICINE CLINIC | Facility: CLINIC | Age: 44
End: 2024-11-11
Payer: COMMERCIAL

## 2024-11-11 VITALS
DIASTOLIC BLOOD PRESSURE: 82 MMHG | WEIGHT: 263.8 LBS | SYSTOLIC BLOOD PRESSURE: 132 MMHG | HEIGHT: 78 IN | OXYGEN SATURATION: 97 % | HEART RATE: 73 BPM | BODY MASS INDEX: 30.52 KG/M2

## 2024-11-11 DIAGNOSIS — E78.2 MIXED HYPERLIPIDEMIA: ICD-10-CM

## 2024-11-11 DIAGNOSIS — Z11.59 NEED FOR HEPATITIS C SCREENING TEST: ICD-10-CM

## 2024-11-11 DIAGNOSIS — Z00.00 WELLNESS EXAMINATION: Primary | ICD-10-CM

## 2024-11-11 DIAGNOSIS — Z12.11 SCREEN FOR COLON CANCER: ICD-10-CM

## 2024-11-11 DIAGNOSIS — Z13.1 SCREENING FOR DIABETES MELLITUS: ICD-10-CM

## 2024-11-11 PROBLEM — M54.16 LUMBAR RADICULOPATHY: Status: RESOLVED | Noted: 2023-09-05 | Resolved: 2024-11-11

## 2024-11-11 PROBLEM — M25.572 ANKLE PAIN, LEFT: Status: RESOLVED | Noted: 2017-01-25 | Resolved: 2024-11-11

## 2024-11-11 PROBLEM — M77.12 LEFT LATERAL EPICONDYLITIS: Status: RESOLVED | Noted: 2020-11-20 | Resolved: 2024-11-11

## 2024-11-11 PROBLEM — M51.06 INTERVERTEBRAL LUMBAR DISC DISORDER WITH MYELOPATHY, LUMBAR REGION: Status: RESOLVED | Noted: 2023-09-05 | Resolved: 2024-11-11

## 2024-11-11 PROBLEM — F32.A DEPRESSION: Status: RESOLVED | Noted: 2021-12-07 | Resolved: 2024-11-11

## 2024-11-11 PROBLEM — M79.672 PAIN OF LEFT HEEL: Status: RESOLVED | Noted: 2017-01-23 | Resolved: 2024-11-11

## 2024-11-11 PROBLEM — M84.375A STRESS FRACTURE, LEFT FOOT, INITIAL ENCOUNTER FOR FRACTURE: Status: RESOLVED | Noted: 2017-10-23 | Resolved: 2024-11-11

## 2024-11-11 PROBLEM — M75.111 PARTIAL NONTRAUMATIC TEAR OF RIGHT ROTATOR CUFF: Status: RESOLVED | Noted: 2023-01-25 | Resolved: 2024-11-11

## 2024-11-11 PROBLEM — G57.52 TARSAL TUNNEL SYNDROME, LEFT: Status: RESOLVED | Noted: 2017-12-27 | Resolved: 2024-11-11

## 2024-11-11 LAB
ANION GAP SERPL CALCULATED.3IONS-SCNC: 11.9 MMOL/L (ref 5–15)
BUN SERPL-MCNC: 13 MG/DL (ref 6–20)
BUN/CREAT SERPL: 9.7 (ref 7–25)
CALCIUM SPEC-SCNC: 9.5 MG/DL (ref 8.6–10.5)
CHLORIDE SERPL-SCNC: 98 MMOL/L (ref 98–107)
CHOLEST SERPL-MCNC: 204 MG/DL (ref 0–200)
CO2 SERPL-SCNC: 26.1 MMOL/L (ref 22–29)
CREAT SERPL-MCNC: 1.34 MG/DL (ref 0.76–1.27)
EGFRCR SERPLBLD CKD-EPI 2021: 66.2 ML/MIN/1.73
GLUCOSE SERPL-MCNC: 64 MG/DL (ref 65–99)
HBA1C MFR BLD: 5.6 % (ref 4.8–5.6)
HCV AB SER QL: NORMAL
HDLC SERPL-MCNC: 23 MG/DL (ref 40–60)
LDLC SERPL CALC-MCNC: 162 MG/DL (ref 0–100)
LDLC/HDLC SERPL: 6.97 {RATIO}
POTASSIUM SERPL-SCNC: 4.5 MMOL/L (ref 3.5–5.2)
SODIUM SERPL-SCNC: 136 MMOL/L (ref 136–145)
TRIGL SERPL-MCNC: 104 MG/DL (ref 0–150)
VLDLC SERPL-MCNC: 19 MG/DL (ref 5–40)

## 2024-11-11 PROCEDURE — 80061 LIPID PANEL: CPT | Performed by: STUDENT IN AN ORGANIZED HEALTH CARE EDUCATION/TRAINING PROGRAM

## 2024-11-11 PROCEDURE — 83036 HEMOGLOBIN GLYCOSYLATED A1C: CPT | Performed by: STUDENT IN AN ORGANIZED HEALTH CARE EDUCATION/TRAINING PROGRAM

## 2024-11-11 PROCEDURE — 86803 HEPATITIS C AB TEST: CPT | Performed by: STUDENT IN AN ORGANIZED HEALTH CARE EDUCATION/TRAINING PROGRAM

## 2024-11-11 PROCEDURE — 36415 COLL VENOUS BLD VENIPUNCTURE: CPT | Performed by: STUDENT IN AN ORGANIZED HEALTH CARE EDUCATION/TRAINING PROGRAM

## 2024-11-11 PROCEDURE — 99396 PREV VISIT EST AGE 40-64: CPT | Performed by: STUDENT IN AN ORGANIZED HEALTH CARE EDUCATION/TRAINING PROGRAM

## 2024-11-11 PROCEDURE — 80048 BASIC METABOLIC PNL TOTAL CA: CPT | Performed by: STUDENT IN AN ORGANIZED HEALTH CARE EDUCATION/TRAINING PROGRAM

## 2024-11-11 RX ORDER — AZELASTINE 1 MG/ML
2 SPRAY, METERED NASAL 2 TIMES DAILY
Qty: 30 ML | Refills: 6 | Status: SHIPPED | OUTPATIENT
Start: 2024-11-11

## 2024-11-11 NOTE — PROGRESS NOTES
"Chief Complaint  Chief Complaint   Patient presents with    Establish Care       Subjective        Martinez Cobb is a 44 y.o. male who presents to [facility].  History of Present Illness    Martinez (\"Leo\") is a 44-year-old male here for      Wellness  Diet: no concerns, usually healthy diet with high protein  Exercise: 5x/week (3 days weights, 2 days cardio)  Smoking: Former smoker (quit in 2012)  ETOH: 3 drinks per week (beer)              Objective   /82   Pulse 73   Ht 198.1 cm (77.99\")   Wt 120 kg (263 lb 12.8 oz)   SpO2 97%   BMI 30.49 kg/m²     Estimated body mass index is 30.49 kg/m² as calculated from the following:    Height as of this encounter: 198.1 cm (77.99\").    Weight as of this encounter: 120 kg (263 lb 12.8 oz).     Physical Exam   GEN: In no acute distress, non toxic appearing  HEENT: EOMI. Mucous membranes moist. Oropharynx without erythema or exudate.   CV: Regular rate and rhythm, no murmurs. No extremity edema.   RESP: Lungs clear to auscultation bilaterally.  No signs of respiratory distress on room air.  SKIN: No rashes  MSK: No joint erythema, deformity, or effusion.   NEURO: AAO to person, place, and time. CN 2-12 intact grossly.   PSYCH: Affect normal, insight fair     PHQ-2 Depression Screening  Little interest or pleasure in doing things? Not at all   Feeling down, depressed, or hopeless? Not at all   PHQ-2 Total Score 0         Result Review :              Assessment and Plan     Diagnoses and all orders for this visit:    1. Wellness examination (Primary)  Doing well.    Encourage healthy lifestyle choices such as healthy diet choices (low carbohydrates, increase fruits/vegetables, less processed foods, limiting portion sizes) as well as increasing physical activity with goal of 150 minutes of moderate intensity exercise per week.    Routine screening labs ordered.    Immunizations  - Influenza: Up-to-date    Screenings  - CRC: Due, " referral for colonoscopy placed        2. Need for hepatitis C screening test  -     Hepatitis C Antibody    3. Mixed hyperlipidemia  -     Lipid Panel    4. Screening for diabetes mellitus  -     Basic Metabolic Panel  -     Hemoglobin A1c              Follow Up     Return in about 1 year (around 11/11/2025) for Annual physical.

## 2025-04-05 NOTE — ANESTHESIA POSTPROCEDURE EVALUATION
Patient: Martinez Cobb    Procedure Summary     Date: 02/24/23 Room / Location: [facility] OR 11 / [facility] MAIN OR    Anesthesia Start: 1748 Anesthesia Stop: 1840    Procedure: SHOULDER ARTHROSCOPY WITH ROTATOR CUFF REPAIR distal clavicle excision, subacromial decopression,  extensive debridement (Right: Shoulder) Diagnosis:       Partial nontraumatic tear of right rotator cuff      (Partial nontraumatic tear of right rotator cuff [M75.111])    Surgeons: Karson Mcpherson MD Provider: Joel Gallagher MD    Anesthesia Type: general with block ASA Status: 2          Anesthesia Type: general with block    Vitals  Vitals Value Taken Time   /83 02/24/23 1911   Temp 97.1 °F (36.2 °C) 02/24/23 1910   Pulse 89 02/24/23 1913   Resp 16 02/24/23 1910   SpO2 96 % 02/24/23 1913   Vitals shown include unvalidated device data.        Post Anesthesia Care and Evaluation    Patient location during evaluation: PACU  Patient participation: complete - patient participated  Level of consciousness: awake  Pain scale: See nurse's notes for pain score.  Pain management: adequate    Airway patency: patent  Anesthetic complications: No anesthetic complications  PONV Status: none  Cardiovascular status: acceptable  Respiratory status: acceptable and spontaneous ventilation  Hydration status: acceptable    Comments: Patient seen and examined postoperatively; vital signs stable; SpO2 greater than or equal to 90%; cardiopulmonary status stable; nausea/vomiting adequately controlled; pain adequately controlled; no apparent anesthesia complications; patient discharged from anesthesia care when discharge criteria were met      
independent

## 2025-05-22 ENCOUNTER — OFFICE (OUTPATIENT)
Dept: URBAN - METROPOLITAN AREA PATHOLOGY 19 | Facility: PATHOLOGY | Age: 45
End: 2025-05-22
Payer: COMMERCIAL

## 2025-05-22 DIAGNOSIS — Z12.11 ENCOUNTER FOR SCREENING FOR MALIGNANT NEOPLASM OF COLON: ICD-10-CM

## 2025-05-22 DIAGNOSIS — D12.5 BENIGN NEOPLASM OF SIGMOID COLON: ICD-10-CM

## 2025-05-22 PROBLEM — K63.5 POLYP OF COLON: Status: ACTIVE | Noted: 2025-05-22

## 2025-05-22 PROCEDURE — 88305 TISSUE EXAM BY PATHOLOGIST: CPT | Mod: 26 | Performed by: PATHOLOGY

## 2025-06-02 RX ORDER — AZELASTINE 1 MG/ML
2 SPRAY, METERED NASAL 2 TIMES DAILY
Qty: 30 ML | Refills: 6 | Status: SHIPPED | OUTPATIENT
Start: 2025-06-02

## 2025-08-27 ENCOUNTER — OFFICE VISIT (OUTPATIENT)
Dept: FAMILY MEDICINE CLINIC | Facility: CLINIC | Age: 45
End: 2025-08-27
Payer: COMMERCIAL

## 2025-08-27 VITALS
DIASTOLIC BLOOD PRESSURE: 82 MMHG | BODY MASS INDEX: 29.36 KG/M2 | OXYGEN SATURATION: 97 % | SYSTOLIC BLOOD PRESSURE: 128 MMHG | HEIGHT: 78 IN | HEART RATE: 79 BPM | WEIGHT: 253.8 LBS

## 2025-08-27 DIAGNOSIS — R53.82 CHRONIC FATIGUE: Primary | ICD-10-CM

## 2025-08-27 DIAGNOSIS — R63.4 UNEXPLAINED WEIGHT LOSS: ICD-10-CM

## 2025-08-27 PROCEDURE — 99214 OFFICE O/P EST MOD 30 MIN: CPT | Performed by: STUDENT IN AN ORGANIZED HEALTH CARE EDUCATION/TRAINING PROGRAM

## 2025-08-27 RX ORDER — CETIRIZINE HYDROCHLORIDE 10 MG/1
10 TABLET ORAL DAILY
COMMUNITY

## 2025-08-28 PROCEDURE — 85025 COMPLETE CBC W/AUTO DIFF WBC: CPT | Performed by: STUDENT IN AN ORGANIZED HEALTH CARE EDUCATION/TRAINING PROGRAM

## 2025-08-28 PROCEDURE — 85652 RBC SED RATE AUTOMATED: CPT | Performed by: STUDENT IN AN ORGANIZED HEALTH CARE EDUCATION/TRAINING PROGRAM

## 2025-08-28 PROCEDURE — 86140 C-REACTIVE PROTEIN: CPT | Performed by: STUDENT IN AN ORGANIZED HEALTH CARE EDUCATION/TRAINING PROGRAM

## 2025-08-28 PROCEDURE — 84403 ASSAY OF TOTAL TESTOSTERONE: CPT | Performed by: STUDENT IN AN ORGANIZED HEALTH CARE EDUCATION/TRAINING PROGRAM

## 2025-08-28 PROCEDURE — 84443 ASSAY THYROID STIM HORMONE: CPT | Performed by: STUDENT IN AN ORGANIZED HEALTH CARE EDUCATION/TRAINING PROGRAM

## 2025-08-28 PROCEDURE — 80053 COMPREHEN METABOLIC PANEL: CPT | Performed by: STUDENT IN AN ORGANIZED HEALTH CARE EDUCATION/TRAINING PROGRAM

## 2025-08-28 PROCEDURE — 83036 HEMOGLOBIN GLYCOSYLATED A1C: CPT | Performed by: STUDENT IN AN ORGANIZED HEALTH CARE EDUCATION/TRAINING PROGRAM

## 2025-08-28 PROCEDURE — 84402 ASSAY OF FREE TESTOSTERONE: CPT | Performed by: STUDENT IN AN ORGANIZED HEALTH CARE EDUCATION/TRAINING PROGRAM

## (undated) DEVICE — SOL IRRIG H2O 1000ML STRL

## (undated) DEVICE — PK SHLDR ARTHROSCOPY 50

## (undated) DEVICE — PK BASIC SPINE 50

## (undated) DEVICE — SUT PDS 1 CTX 36IN VIO PDP371T

## (undated) DEVICE — UNDYED BRAIDED (POLYGLACTIN 910), SYNTHETIC ABSORBABLE SUTURE: Brand: COATED VICRYL

## (undated) DEVICE — 3M(TM) TEGADERM(TM) IV TRANSPARENT FILM DRESSING WITH BORDER 1655: Brand: 3M™ TEGADERM™

## (undated) DEVICE — CVR HNDL LT SURG ACCSSRY BLU STRL

## (undated) DEVICE — Device

## (undated) DEVICE — COVADERM: Brand: DEROYAL

## (undated) DEVICE — SPNG GZ AVANT 6PLY 4X4IN STRL PK/2

## (undated) DEVICE — SLV SCD CALF HEMOFORCE DVT THERP REPROC MD

## (undated) DEVICE — SMOKE EVACUATION TUBING WITH 7/8 IN TO 1/4 IN REDUCER: Brand: BUFFALO FILTER

## (undated) DEVICE — TUBING, SUCTION, 1/4" X 12', STRAIGHT: Brand: MEDLINE

## (undated) DEVICE — GLV SURG DERMASSURE GRN LF PF 8.5

## (undated) DEVICE — UNDERGLV SURG BIOGEL/PI PF SYNTH SURG SZ8.5 BLU 50/BX

## (undated) DEVICE — TBG ARTHSCP PUMP W CONN/REDUC 8FT

## (undated) DEVICE — GLV SURG SENSICARE PI ORTHO SZ7.5 LF STRL

## (undated) DEVICE — SOLUTION,WATER,IRRIGATION,1000ML,STERILE: Brand: MEDLINE

## (undated) DEVICE — 3.0MM PRECISION NEURO (MATCH HEAD)

## (undated) DEVICE — DECANTER: Brand: UNBRANDED

## (undated) DEVICE — PAD CAST SPECIST 3IN STRL

## (undated) DEVICE — GLV SURG SENSICARE SLT PF LF 7 STRL

## (undated) DEVICE — BUR RND COOL CUT 8FLUT 4MM 13CM

## (undated) DEVICE — ABL ASP APOLLO RF XL 90D

## (undated) DEVICE — PAD UNDERCAST WYTEX 4IN 4YD LF STRL

## (undated) DEVICE — DRSNG TELFA PAD NONADH STR 1S 3X4IN

## (undated) DEVICE — BNDG ELAS MATRX V/CLS 4IN 5YD LF

## (undated) DEVICE — KT SURG TURNOVER 050

## (undated) DEVICE — PAD,ABDOMINAL,5"X9",STERILE,LF,1/PK: Brand: MEDLINE INDUSTRIES, INC.

## (undated) DEVICE — BLD SHAVER EXCALIBUR CRV 4MM

## (undated) DEVICE — SOL IRR NACL 0.9PCT ARTHROMATIC 3000ML

## (undated) DEVICE — UNDERGLV SURG BIOGEL INDICATOR LF PF 7.5

## (undated) DEVICE — SOL IRRIG NACL 1000ML

## (undated) DEVICE — DRP OPMI 326071

## (undated) DEVICE — SKIN AFFIX SURG ADHESIVE 72/CS 0.55ML: Brand: MEDLINE

## (undated) DEVICE — INTENDED FOR TISSUE SEPARATION, AND OTHER PROCEDURES THAT REQUIRE A SHARP SURGICAL BLADE TO PUNCTURE OR CUT.: Brand: BARD-PARKER ® CARBON RIB-BACK BLADES

## (undated) DEVICE — GOWN,SLEEVE,STERILE,W/CSR WRAP,1/P: Brand: MEDLINE

## (undated) DEVICE — CANN TRPL DAM 7X7MM

## (undated) DEVICE — TBG ARTHSCP PT W CONN/REDUC 8FT

## (undated) DEVICE — UNDRGLV SURG BIOGEL PIMICROINDICATOR SYNTH SZ7 LF STRL

## (undated) DEVICE — ANTIBACTERIAL UNDYED BRAIDED (POLYGLACTIN 910), SYNTHETIC ABSORBABLE SUTURE: Brand: COATED VICRYL

## (undated) DEVICE — GLV SURG SIGNATURE ESSENTIAL PF LTX SZ8.5

## (undated) DEVICE — UNDERGLV SURG BIOGEL INDICAT PI SZ8 BLU

## (undated) DEVICE — GLV SURG SENSICARE PI LF PF 8 GRN STRL

## (undated) DEVICE — GLV SURG SIGNATURE ESSENTIAL PF LTX SZ7.5

## (undated) DEVICE — SUT MONOCRYL 4/0 PS2 27IN Y426H ETY426H

## (undated) DEVICE — BNDG ESMARK 6INX9FT STRL

## (undated) DEVICE — CUFF TOURNI 1BLADDER 1PRT 18IN STRL

## (undated) DEVICE — 3M™ STERI-DRAPE™ U-DRAPE 1015: Brand: STERI-DRAPE™

## (undated) DEVICE — GLV SURG SENSICARE PI ORTHO SZ8.5 LF STRL

## (undated) DEVICE — GLV SURG SENSICARE PI ORTHO SZ8 LF STRL

## (undated) DEVICE — SUT VIC 3/0 PS2 18IN J497G BX/12

## (undated) DEVICE — NDL SUT MAYO CIR NO2 18242D PK/2

## (undated) DEVICE — NDL MAYO CATGUT 1/2 CIR 18244D PK/2

## (undated) DEVICE — PK EXTREM 50

## (undated) DEVICE — GLV SURG SIGNATURE ESSENTIAL PF LTX SZ8

## (undated) DEVICE — ADHS LIQ MASTISOL 2/3ML

## (undated) DEVICE — TBG ARTHSCP DUALWAVE

## (undated) DEVICE — ELECTRD BLD EZ CLN MOD 4IN

## (undated) DEVICE — GOWN,REINFORCE,POLY,SIRUS,BREATH SLV,XLG: Brand: MEDLINE